# Patient Record
Sex: FEMALE | Race: WHITE | NOT HISPANIC OR LATINO | Employment: FULL TIME | URBAN - METROPOLITAN AREA
[De-identification: names, ages, dates, MRNs, and addresses within clinical notes are randomized per-mention and may not be internally consistent; named-entity substitution may affect disease eponyms.]

---

## 2023-03-01 PROBLEM — G47.00 INSOMNIA: Status: ACTIVE | Noted: 2023-03-01

## 2023-03-01 PROBLEM — M17.11 ARTHRITIS OF RIGHT KNEE: Status: ACTIVE | Noted: 2023-03-01

## 2023-03-01 PROBLEM — M23.203 DEGENERATIVE TEAR OF MEDIAL MENISCUS OF RIGHT KNEE: Status: ACTIVE | Noted: 2023-03-01

## 2023-03-01 PROBLEM — H52.13 MYOPIA OF BOTH EYES WITH REGULAR ASTIGMATISM: Status: ACTIVE | Noted: 2023-03-01

## 2023-03-01 PROBLEM — M72.2 PLANTAR FASCIITIS OF LEFT FOOT: Status: ACTIVE | Noted: 2023-03-01

## 2023-03-01 PROBLEM — N94.10 DYSPAREUNIA, FEMALE: Status: ACTIVE | Noted: 2023-03-01

## 2023-03-01 PROBLEM — R05.8 POST-VIRAL COUGH SYNDROME: Status: ACTIVE | Noted: 2023-03-01

## 2023-03-01 PROBLEM — M67.02 ACQUIRED SHORT ACHILLES TENDON OF LEFT LOWER EXTREMITY: Status: ACTIVE | Noted: 2023-03-01

## 2023-03-01 PROBLEM — F32.A ANXIETY AND DEPRESSION: Status: ACTIVE | Noted: 2023-03-01

## 2023-03-01 PROBLEM — H90.3 SENSORINEURAL HEARING LOSS, BILATERAL: Status: ACTIVE | Noted: 2023-03-01

## 2023-03-01 PROBLEM — E78.00 ELEVATED LDL CHOLESTEROL LEVEL: Status: ACTIVE | Noted: 2023-03-01

## 2023-03-01 PROBLEM — H52.223 MYOPIA OF BOTH EYES WITH REGULAR ASTIGMATISM: Status: ACTIVE | Noted: 2023-03-01

## 2023-03-01 PROBLEM — E66.812 CLASS 2 SEVERE OBESITY WITH SERIOUS COMORBIDITY AND BODY MASS INDEX (BMI) OF 37.0 TO 37.9 IN ADULT: Status: ACTIVE | Noted: 2023-03-01

## 2023-03-01 PROBLEM — Z86.19 HISTORY OF HERPES ZOSTER: Status: ACTIVE | Noted: 2023-03-01

## 2023-03-01 PROBLEM — M62.81 MUSCLE WEAKNESS: Status: ACTIVE | Noted: 2023-03-01

## 2023-03-01 PROBLEM — M76.32 ILIOTIBIAL BAND SYNDROME OF LEFT SIDE: Status: ACTIVE | Noted: 2023-03-01

## 2023-03-01 PROBLEM — M25.561 RIGHT KNEE PAIN: Status: ACTIVE | Noted: 2023-03-01

## 2023-03-01 PROBLEM — R39.9 UTI SYMPTOMS: Status: ACTIVE | Noted: 2023-03-01

## 2023-03-01 PROBLEM — E66.01 CLASS 2 SEVERE OBESITY WITH SERIOUS COMORBIDITY AND BODY MASS INDEX (BMI) OF 37.0 TO 37.9 IN ADULT (MULTI): Status: ACTIVE | Noted: 2023-03-01

## 2023-03-01 PROBLEM — F41.9 ANXIETY AND DEPRESSION: Status: ACTIVE | Noted: 2023-03-01

## 2023-03-01 PROBLEM — R35.0 URINARY FREQUENCY: Status: ACTIVE | Noted: 2023-03-01

## 2023-03-01 PROBLEM — R42 VERTIGO: Status: ACTIVE | Noted: 2023-03-01

## 2023-03-01 PROBLEM — R51.9 HEADACHE: Status: ACTIVE | Noted: 2023-03-01

## 2023-03-01 PROBLEM — M17.12 PRIMARY OSTEOARTHRITIS OF LEFT KNEE: Status: ACTIVE | Noted: 2023-03-01

## 2023-03-01 PROBLEM — G43.109 OPHTHALMIC MIGRAINE: Status: ACTIVE | Noted: 2023-03-01

## 2023-03-01 PROBLEM — Q66.70 CAVUS DEFORMITY OF FOOT: Status: ACTIVE | Noted: 2023-03-01

## 2023-03-01 PROBLEM — M17.11 OSTEOARTHRITIS OF RIGHT PATELLOFEMORAL JOINT: Status: ACTIVE | Noted: 2023-03-01

## 2023-03-01 PROBLEM — H52.03 HYPERMETROPIA OF BOTH EYES: Status: ACTIVE | Noted: 2023-03-01

## 2023-03-01 PROBLEM — B37.31 YEAST VAGINITIS: Status: ACTIVE | Noted: 2023-03-01

## 2023-03-01 PROBLEM — R05.8 POST-VIRAL COUGH SYNDROME: Status: RESOLVED | Noted: 2023-03-01 | Resolved: 2023-03-01

## 2023-03-01 PROBLEM — R73.01 ELEVATED FASTING GLUCOSE: Status: ACTIVE | Noted: 2023-03-01

## 2023-03-01 PROBLEM — H81.10 BENIGN PAROXYSMAL POSITIONAL VERTIGO: Status: ACTIVE | Noted: 2023-03-01

## 2023-03-01 PROBLEM — M79.672 PAIN OF LEFT HEEL: Status: ACTIVE | Noted: 2023-03-01

## 2023-03-01 PROBLEM — J40 BRONCHITIS: Status: ACTIVE | Noted: 2023-03-01

## 2023-03-01 PROBLEM — H66.90 EAR INFECTION: Status: ACTIVE | Noted: 2023-03-01

## 2023-03-01 RX ORDER — CALCIUM CARBONATE/VITAMIN D3 600MG-5MCG
TABLET ORAL
COMMUNITY

## 2023-03-01 RX ORDER — ALBUTEROL SULFATE 90 UG/1
1-2 AEROSOL, METERED RESPIRATORY (INHALATION) EVERY 4 HOURS PRN
COMMUNITY
Start: 2019-11-06

## 2023-03-09 ENCOUNTER — OFFICE VISIT (OUTPATIENT)
Dept: PRIMARY CARE | Facility: CLINIC | Age: 58
End: 2023-03-09
Payer: COMMERCIAL

## 2023-03-09 VITALS
WEIGHT: 205 LBS | DIASTOLIC BLOOD PRESSURE: 71 MMHG | BODY MASS INDEX: 35.74 KG/M2 | SYSTOLIC BLOOD PRESSURE: 128 MMHG | HEART RATE: 68 BPM

## 2023-03-09 DIAGNOSIS — F41.9 ANXIETY AND DEPRESSION: ICD-10-CM

## 2023-03-09 DIAGNOSIS — F41.9 ANXIETY: Primary | ICD-10-CM

## 2023-03-09 DIAGNOSIS — F32.A ANXIETY AND DEPRESSION: ICD-10-CM

## 2023-03-09 DIAGNOSIS — E78.00 ELEVATED LDL CHOLESTEROL LEVEL: ICD-10-CM

## 2023-03-09 DIAGNOSIS — E66.01 CLASS 2 SEVERE OBESITY DUE TO EXCESS CALORIES WITH SERIOUS COMORBIDITY AND BODY MASS INDEX (BMI) OF 37.0 TO 37.9 IN ADULT (MULTI): ICD-10-CM

## 2023-03-09 PROBLEM — B37.31 YEAST VAGINITIS: Status: RESOLVED | Noted: 2023-03-01 | Resolved: 2023-03-09

## 2023-03-09 PROBLEM — M25.561 RIGHT KNEE PAIN: Status: RESOLVED | Noted: 2023-03-01 | Resolved: 2023-03-09

## 2023-03-09 PROBLEM — J40 BRONCHITIS: Status: RESOLVED | Noted: 2023-03-01 | Resolved: 2023-03-09

## 2023-03-09 PROBLEM — R39.9 UTI SYMPTOMS: Status: RESOLVED | Noted: 2023-03-01 | Resolved: 2023-03-09

## 2023-03-09 PROBLEM — M23.203 DEGENERATIVE TEAR OF MEDIAL MENISCUS OF RIGHT KNEE: Status: RESOLVED | Noted: 2023-03-01 | Resolved: 2023-03-09

## 2023-03-09 PROBLEM — M79.672 PAIN OF LEFT HEEL: Status: RESOLVED | Noted: 2023-03-01 | Resolved: 2023-03-09

## 2023-03-09 PROBLEM — R35.0 URINARY FREQUENCY: Status: RESOLVED | Noted: 2023-03-01 | Resolved: 2023-03-09

## 2023-03-09 PROBLEM — R42 VERTIGO: Status: RESOLVED | Noted: 2023-03-01 | Resolved: 2023-03-09

## 2023-03-09 PROBLEM — R73.01 ELEVATED FASTING GLUCOSE: Status: RESOLVED | Noted: 2023-03-01 | Resolved: 2023-03-09

## 2023-03-09 PROBLEM — M72.2 PLANTAR FASCIITIS OF LEFT FOOT: Status: RESOLVED | Noted: 2023-03-01 | Resolved: 2023-03-09

## 2023-03-09 PROBLEM — H81.10 BENIGN PAROXYSMAL POSITIONAL VERTIGO: Status: RESOLVED | Noted: 2023-03-01 | Resolved: 2023-03-09

## 2023-03-09 PROBLEM — M17.11 ARTHRITIS OF RIGHT KNEE: Status: RESOLVED | Noted: 2023-03-01 | Resolved: 2023-03-09

## 2023-03-09 PROBLEM — M62.81 MUSCLE WEAKNESS: Status: RESOLVED | Noted: 2023-03-01 | Resolved: 2023-03-09

## 2023-03-09 PROBLEM — M76.32 ILIOTIBIAL BAND SYNDROME OF LEFT SIDE: Status: RESOLVED | Noted: 2023-03-01 | Resolved: 2023-03-09

## 2023-03-09 PROBLEM — M67.02 ACQUIRED SHORT ACHILLES TENDON OF LEFT LOWER EXTREMITY: Status: RESOLVED | Noted: 2023-03-01 | Resolved: 2023-03-09

## 2023-03-09 PROBLEM — Z86.19 HISTORY OF HERPES ZOSTER: Status: RESOLVED | Noted: 2023-03-01 | Resolved: 2023-03-09

## 2023-03-09 PROBLEM — H90.3 SENSORINEURAL HEARING LOSS, BILATERAL: Status: RESOLVED | Noted: 2023-03-01 | Resolved: 2023-03-09

## 2023-03-09 PROBLEM — M17.11 OSTEOARTHRITIS OF RIGHT PATELLOFEMORAL JOINT: Status: RESOLVED | Noted: 2023-03-01 | Resolved: 2023-03-09

## 2023-03-09 PROBLEM — H66.90 EAR INFECTION: Status: RESOLVED | Noted: 2023-03-01 | Resolved: 2023-03-09

## 2023-03-09 PROBLEM — Q66.70 CAVUS DEFORMITY OF FOOT: Status: RESOLVED | Noted: 2023-03-01 | Resolved: 2023-03-09

## 2023-03-09 PROCEDURE — 3008F BODY MASS INDEX DOCD: CPT | Performed by: INTERNAL MEDICINE

## 2023-03-09 PROCEDURE — 1036F TOBACCO NON-USER: CPT | Performed by: INTERNAL MEDICINE

## 2023-03-09 PROCEDURE — 99213 OFFICE O/P EST LOW 20 MIN: CPT | Performed by: INTERNAL MEDICINE

## 2023-03-09 RX ORDER — BUSPIRONE HYDROCHLORIDE 5 MG/1
5 TABLET ORAL 2 TIMES DAILY
COMMUNITY
End: 2023-03-09 | Stop reason: SDUPTHER

## 2023-03-09 RX ORDER — BUSPIRONE HYDROCHLORIDE 5 MG/1
5 TABLET ORAL 2 TIMES DAILY
Qty: 180 TABLET | Refills: 1 | Status: SHIPPED | OUTPATIENT
Start: 2023-03-09 | End: 2023-08-15 | Stop reason: ALTCHOICE

## 2023-03-09 ASSESSMENT — PATIENT HEALTH QUESTIONNAIRE - PHQ9
2. FEELING DOWN, DEPRESSED OR HOPELESS: MORE THAN HALF THE DAYS
6. FEELING BAD ABOUT YOURSELF - OR THAT YOU ARE A FAILURE OR HAVE LET YOURSELF OR YOUR FAMILY DOWN: NOT AT ALL
7. TROUBLE CONCENTRATING ON THINGS, SUCH AS READING THE NEWSPAPER OR WATCHING TELEVISION: NOT AT ALL
3. TROUBLE FALLING OR STAYING ASLEEP OR SLEEPING TOO MUCH: NOT AT ALL
1. LITTLE INTEREST OR PLEASURE IN DOING THINGS: MORE THAN HALF THE DAYS
5. POOR APPETITE OR OVEREATING: NOT AT ALL
4. FEELING TIRED OR HAVING LITTLE ENERGY: MORE THAN HALF THE DAYS
SUM OF ALL RESPONSES TO PHQ9 QUESTIONS 1 AND 2: 4
10. IF YOU CHECKED OFF ANY PROBLEMS, HOW DIFFICULT HAVE THESE PROBLEMS MADE IT FOR YOU TO DO YOUR WORK, TAKE CARE OF THINGS AT HOME, OR GET ALONG WITH OTHER PEOPLE: NOT DIFFICULT AT ALL
SUM OF ALL RESPONSES TO PHQ QUESTIONS 1-9: 7
9. THOUGHTS THAT YOU WOULD BE BETTER OFF DEAD, OR OF HURTING YOURSELF: NOT AT ALL
8. MOVING OR SPEAKING SO SLOWLY THAT OTHER PEOPLE COULD HAVE NOTICED. OR THE OPPOSITE, BEING SO FIGETY OR RESTLESS THAT YOU HAVE BEEN MOVING AROUND A LOT MORE THAN USUAL: SEVERAL DAYS

## 2023-03-09 NOTE — ASSESSMENT & PLAN NOTE
Considering patient did not tolerate the Wellbutrin we will change for BuSpar twice daily.  Return to clinic in 30 days and if no significant improvement will consider SSRI

## 2023-03-09 NOTE — PROGRESS NOTES
Subjective   Patient ID: Brandy Lopez is a 57 y.o. female who presents for Depression.    HPI     Patient is a 57-year-old female who presents with chief complaint of ongoing symptoms of depression.  She was on Wellbutrin but she states that its not effective and she is concerned about the weight gain on the medication.  She is has gained 2 pound since last visit.  She wishes to stop the medication and start an alternate medication.  She is concerned about the SSRIs with weight gain as well.  She states that she can barely get through the day and finds it a struggle to get tasks completed at the end of the day when she returns from home.    Review of Systems  Constitutional: No fever or chills  Cardiovascular: no chest pain, no palpitations and no syncope.   Respiratory: no cough, no shortness of breath during exertion and no shortness of breath at rest.   Gastrointestinal: no abdominal pain, no nausea and no vomiting.  Neuro: No Headache, no dizziness    Objective   /71   Pulse 68   Wt 93 kg (205 lb)   BMI 35.74 kg/m²     Physical Exam  Constitutional: Alert and in no acute distress. Well developed, well nourished  Head and Face: Head and face: Normal.    Cardiovascular: Heart rate and rhythm were normal, normal S1 and S2. No peripheral edema.   Pulmonary: No respiratory distress. Clear bilateral breath sounds.  Musculoskeletal: Gait and station: Normal. Muscle strength/tone: Normal.   Skin: Normal skin color and pigmentation, normal skin turgor, and no rash.    Psychiatric: Judgment and insight: Intact. Mood and affect: Normal.     [unfilled]      Assessment/Plan   Problem List Items Addressed This Visit          Endocrine/Metabolic    Class 2 severe obesity with serious comorbidity and body mass index (BMI) of 37.0 to 37.9 in adult (CMS/HCC)       Other    Anxiety and depression     Considering patient did not tolerate the Wellbutrin we will change for BuSpar twice daily.  Return to clinic in 30  days and if no significant improvement will consider SSRI         Elevated LDL cholesterol level     Other Visit Diagnoses       Anxiety    -  Primary    Relevant Medications    busPIRone (Buspar) 5 mg tablet        Considering no improvement on the Wellbutrin as well as slight weight gain we will change medications to BuSpar twice daily.  If no significant improvement will need follow-up in 30 days for consideration of SSRI        Follow up in 30 days    Your yearly Physical is due in:   When you call the office for your yearly Physical, please ask them to inform me to order your blood work, so that you can get the fasting blood work before your appointment and we can discuss the results at your physical.      Please call me if any questions arise from now until your next visit. I will call you after I am done seeing patients. A Doctor is always available by phone when the office is closed. Please feel free to call for help with any problem that you feel shouldn't wait until the office re-opens.     Pietro Sosa, DO

## 2023-04-10 ENCOUNTER — OFFICE VISIT (OUTPATIENT)
Dept: PRIMARY CARE | Facility: CLINIC | Age: 58
End: 2023-04-10
Payer: COMMERCIAL

## 2023-04-10 VITALS
HEART RATE: 81 BPM | BODY MASS INDEX: 36.62 KG/M2 | DIASTOLIC BLOOD PRESSURE: 80 MMHG | SYSTOLIC BLOOD PRESSURE: 131 MMHG | WEIGHT: 210 LBS

## 2023-04-10 DIAGNOSIS — F41.9 ANXIETY AND DEPRESSION: ICD-10-CM

## 2023-04-10 DIAGNOSIS — F32.A ANXIETY AND DEPRESSION: ICD-10-CM

## 2023-04-10 DIAGNOSIS — F51.01 PRIMARY INSOMNIA: Primary | ICD-10-CM

## 2023-04-10 PROCEDURE — 99213 OFFICE O/P EST LOW 20 MIN: CPT | Performed by: INTERNAL MEDICINE

## 2023-04-10 PROCEDURE — 1036F TOBACCO NON-USER: CPT | Performed by: INTERNAL MEDICINE

## 2023-04-10 PROCEDURE — 3008F BODY MASS INDEX DOCD: CPT | Performed by: INTERNAL MEDICINE

## 2023-04-10 RX ORDER — SERTRALINE HYDROCHLORIDE 25 MG/1
25 TABLET, FILM COATED ORAL DAILY
Qty: 30 TABLET | Refills: 1 | Status: SHIPPED | OUTPATIENT
Start: 2023-04-10 | End: 2023-05-15 | Stop reason: SDUPTHER

## 2023-04-10 ASSESSMENT — PATIENT HEALTH QUESTIONNAIRE - PHQ9
7. TROUBLE CONCENTRATING ON THINGS, SUCH AS READING THE NEWSPAPER OR WATCHING TELEVISION: NOT AT ALL
SUM OF ALL RESPONSES TO PHQ QUESTIONS 1-9: 5
1. LITTLE INTEREST OR PLEASURE IN DOING THINGS: MORE THAN HALF THE DAYS
5. POOR APPETITE OR OVEREATING: NOT AT ALL
6. FEELING BAD ABOUT YOURSELF - OR THAT YOU ARE A FAILURE OR HAVE LET YOURSELF OR YOUR FAMILY DOWN: NOT AT ALL
3. TROUBLE FALLING OR STAYING ASLEEP OR SLEEPING TOO MUCH: NOT AT ALL
8. MOVING OR SPEAKING SO SLOWLY THAT OTHER PEOPLE COULD HAVE NOTICED. OR THE OPPOSITE, BEING SO FIGETY OR RESTLESS THAT YOU HAVE BEEN MOVING AROUND A LOT MORE THAN USUAL: NOT AT ALL
SUM OF ALL RESPONSES TO PHQ9 QUESTIONS 1 AND 2: 4
10. IF YOU CHECKED OFF ANY PROBLEMS, HOW DIFFICULT HAVE THESE PROBLEMS MADE IT FOR YOU TO DO YOUR WORK, TAKE CARE OF THINGS AT HOME, OR GET ALONG WITH OTHER PEOPLE: NOT DIFFICULT AT ALL
4. FEELING TIRED OR HAVING LITTLE ENERGY: SEVERAL DAYS
2. FEELING DOWN, DEPRESSED OR HOPELESS: MORE THAN HALF THE DAYS
9. THOUGHTS THAT YOU WOULD BE BETTER OFF DEAD, OR OF HURTING YOURSELF: NOT AT ALL

## 2023-04-10 NOTE — ASSESSMENT & PLAN NOTE
Patient did not tolerate Wellbutrin or BuSpar.  At this point patient is agreeable to start low-dose SSRI.  May add BuSpar on an as-needed basis.  Will refer to psychiatrist at this time.  Please follow-up in 30 days and if you feel like you need to follow-up earlier please consider calling her making an earlier appointment

## 2023-04-10 NOTE — PROGRESS NOTES
Subjective   Patient ID: Brandy Lopez is a 57 y.o. female who presents for Follow-up.    HPI     Patient is a 57-year-old female who presents with chief complaint of ongoing symptoms of depression.  She was on Wellbutrin but she states that its not effective and she is concerned about the weight gain on the medication.  She is concerned about the SSRIs with weight gain as well.  She states that she can barely get through the day and finds it a struggle to get tasks completed at the end of the day when she returns from home.  She was started on BuSpar last visit but did not see any relief.  She is able to get through the day but finds himself teary and sad on the way home.  She lost her  as well as a recent family member as well.  She has a therapist but does not have a psychiatrist    Review of Systems  Constitutional: No fever or chills  Cardiovascular: no chest pain, no palpitations and no syncope.   Respiratory: no cough, no shortness of breath during exertion and no shortness of breath at rest.   Gastrointestinal: no abdominal pain, no nausea and no vomiting.  Neuro: No Headache, no dizziness    Objective   /80   Pulse 81   Wt 95.3 kg (210 lb)   BMI 36.62 kg/m²     Physical Exam  Constitutional: Alert and in no acute distress. Well developed, well nourished  Head and Face: Head and face: Normal.    Cardiovascular: Heart rate and rhythm were normal, normal S1 and S2. No peripheral edema.   Pulmonary: No respiratory distress. Clear bilateral breath sounds.  Musculoskeletal: Gait and station: Normal. Muscle strength/tone: Normal.   Skin: Normal skin color and pigmentation, normal skin turgor, and no rash.    Psychiatric: Judgment and insight: Intact. Mood and affect: Normal.     [unfilled]      Assessment/Plan   Problem List Items Addressed This Visit          Nervous    Insomnia - Primary     Start low-dose SSRI and referral to psychiatry         Relevant Medications    sertraline (Zoloft) 25  mg tablet    Other Relevant Orders    Referral to Psychiatry       Other    Anxiety and depression     Patient did not tolerate Wellbutrin or BuSpar.  At this point patient is agreeable to start low-dose SSRI.  May add BuSpar on an as-needed basis.  Will refer to psychiatrist at this time.  Please follow-up in 30 days and if you feel like you need to follow-up earlier please consider calling her making an earlier appointment         Relevant Medications    sertraline (Zoloft) 25 mg tablet    Other Relevant Orders    Referral to Psychiatry

## 2023-05-04 DIAGNOSIS — F51.01 PRIMARY INSOMNIA: ICD-10-CM

## 2023-05-04 DIAGNOSIS — F32.A ANXIETY AND DEPRESSION: ICD-10-CM

## 2023-05-04 DIAGNOSIS — F41.9 ANXIETY AND DEPRESSION: ICD-10-CM

## 2023-05-04 RX ORDER — SERTRALINE HYDROCHLORIDE 25 MG/1
TABLET, FILM COATED ORAL
Qty: 30 TABLET | Refills: 1 | OUTPATIENT
Start: 2023-05-04

## 2023-05-15 DIAGNOSIS — F41.9 ANXIETY AND DEPRESSION: ICD-10-CM

## 2023-05-15 DIAGNOSIS — F32.A ANXIETY AND DEPRESSION: ICD-10-CM

## 2023-05-15 DIAGNOSIS — F51.01 PRIMARY INSOMNIA: ICD-10-CM

## 2023-05-15 RX ORDER — SERTRALINE HYDROCHLORIDE 25 MG/1
25 TABLET, FILM COATED ORAL DAILY
Qty: 30 TABLET | Refills: 1 | Status: SHIPPED | OUTPATIENT
Start: 2023-05-15 | End: 2023-05-16 | Stop reason: SDUPTHER

## 2023-05-16 DIAGNOSIS — F41.9 ANXIETY AND DEPRESSION: ICD-10-CM

## 2023-05-16 DIAGNOSIS — F32.A ANXIETY AND DEPRESSION: ICD-10-CM

## 2023-05-16 DIAGNOSIS — F51.01 PRIMARY INSOMNIA: ICD-10-CM

## 2023-05-16 RX ORDER — SERTRALINE HYDROCHLORIDE 25 MG/1
25 TABLET, FILM COATED ORAL DAILY
Qty: 180 TABLET | Refills: 1 | Status: SHIPPED | OUTPATIENT
Start: 2023-05-16 | End: 2023-08-15 | Stop reason: SDUPTHER

## 2023-06-06 DIAGNOSIS — F51.01 PRIMARY INSOMNIA: ICD-10-CM

## 2023-06-06 DIAGNOSIS — F32.A ANXIETY AND DEPRESSION: ICD-10-CM

## 2023-06-06 DIAGNOSIS — F41.9 ANXIETY AND DEPRESSION: ICD-10-CM

## 2023-06-06 RX ORDER — SERTRALINE HYDROCHLORIDE 25 MG/1
TABLET, FILM COATED ORAL
Qty: 30 TABLET | Refills: 1 | OUTPATIENT
Start: 2023-06-06

## 2023-08-01 ENCOUNTER — APPOINTMENT (OUTPATIENT)
Dept: PRIMARY CARE | Facility: CLINIC | Age: 58
End: 2023-08-01
Payer: COMMERCIAL

## 2023-08-15 ENCOUNTER — OFFICE VISIT (OUTPATIENT)
Dept: PRIMARY CARE | Facility: CLINIC | Age: 58
End: 2023-08-15
Payer: COMMERCIAL

## 2023-08-15 VITALS
SYSTOLIC BLOOD PRESSURE: 119 MMHG | HEART RATE: 67 BPM | BODY MASS INDEX: 38.36 KG/M2 | DIASTOLIC BLOOD PRESSURE: 71 MMHG | WEIGHT: 220 LBS

## 2023-08-15 DIAGNOSIS — F41.9 ANXIETY AND DEPRESSION: ICD-10-CM

## 2023-08-15 DIAGNOSIS — F51.01 PRIMARY INSOMNIA: ICD-10-CM

## 2023-08-15 DIAGNOSIS — Z00.00 HEALTH CARE MAINTENANCE: ICD-10-CM

## 2023-08-15 DIAGNOSIS — F32.A ANXIETY AND DEPRESSION: ICD-10-CM

## 2023-08-15 DIAGNOSIS — Z23 NEED FOR VACCINE FOR DT (DIPHTHERIA-TETANUS): Primary | ICD-10-CM

## 2023-08-15 DIAGNOSIS — Z12.31 SCREENING MAMMOGRAM FOR BREAST CANCER: ICD-10-CM

## 2023-08-15 PROCEDURE — 90471 IMMUNIZATION ADMIN: CPT | Performed by: INTERNAL MEDICINE

## 2023-08-15 PROCEDURE — 99213 OFFICE O/P EST LOW 20 MIN: CPT | Performed by: INTERNAL MEDICINE

## 2023-08-15 PROCEDURE — 90715 TDAP VACCINE 7 YRS/> IM: CPT | Performed by: INTERNAL MEDICINE

## 2023-08-15 PROCEDURE — 3008F BODY MASS INDEX DOCD: CPT | Performed by: INTERNAL MEDICINE

## 2023-08-15 PROCEDURE — 1036F TOBACCO NON-USER: CPT | Performed by: INTERNAL MEDICINE

## 2023-08-15 RX ORDER — SERTRALINE HYDROCHLORIDE 25 MG/1
25 TABLET, FILM COATED ORAL DAILY
Qty: 90 TABLET | Refills: 1 | Status: SHIPPED | OUTPATIENT
Start: 2023-08-15 | End: 2024-05-06

## 2023-08-15 NOTE — ASSESSMENT & PLAN NOTE
Given improvement on the low-dose sertraline we will continue at this time.  We will need to monitor weight.  If she continues to gain weight we may need to stop the medication and try an alternate medication.  Follow-up 3 months for reevaluation

## 2023-08-15 NOTE — PROGRESS NOTES
Subjective   Patient ID: Brandy Lopez is a 58 y.o. female who presents for Follow-up.    HPI     Patient is a 58-year-old female who presents with chief complaint of ongoing symptoms of depression.  She currently does not have a psychiatrist.  She was previously on the Wellbutrin but did not tolerate it very well.  Last visit she was started on low-dose sertraline.  She is on 25 mg p.o. daily.  She states that her mood is improved.  She is enjoying life better.  She is sleeping better.  She does not feel down as much.  She wishes to continue the medication.  Of note she has gained 10 pounds since last visit.  Is unclear whether or not this may be related to the medication or eating habits or combination of the 2.    Review of Systems  Constitutional: No fever or chills  Cardiovascular: no chest pain, no palpitations and no syncope.   Respiratory: no cough, no shortness of breath during exertion and no shortness of breath at rest.   Gastrointestinal: no abdominal pain, no nausea and no vomiting.  Neuro: No Headache, no dizziness    Objective   /71   Pulse 67   Wt 99.8 kg (220 lb)   BMI 38.36 kg/m²     Physical Exam  Constitutional: Alert and in no acute distress. Well developed, well nourished  Head and Face: Head and face: Normal.    Cardiovascular: Heart rate and rhythm were normal, normal S1 and S2. No peripheral edema.   Pulmonary: No respiratory distress. Clear bilateral breath sounds.  Musculoskeletal: Gait and station: Normal. Muscle strength/tone: Normal.   Skin: Normal skin color and pigmentation, normal skin turgor, and no rash.    Psychiatric: Judgment and insight: Intact. Mood and affect: Normal.     [unfilled]      Assessment/Plan   Problem List Items Addressed This Visit          Mental Health    Anxiety and depression     Given improvement on the low-dose sertraline we will continue at this time.  We will need to monitor weight.  If she continues to gain weight we may need to stop the  medication and try an alternate medication.  Follow-up 3 months for reevaluation         Relevant Medications    sertraline (Zoloft) 25 mg tablet    Other Relevant Orders    CBC    Comprehensive metabolic panel    Lipid Panel    TSH with reflex to Free T4 if abnormal       Sleep    Insomnia    Relevant Medications    sertraline (Zoloft) 25 mg tablet    Other Relevant Orders    CBC    Comprehensive metabolic panel    Lipid Panel    TSH with reflex to Free T4 if abnormal     Other Visit Diagnoses       Need for vaccine for DT (diphtheria-tetanus)    -  Primary    Relevant Orders    Tdap vaccine, age 10 years and older (BOOSTRIX) (Completed)    CBC    Comprehensive metabolic panel    Lipid Panel    TSH with reflex to Free T4 if abnormal    Health care maintenance        Relevant Orders    CBC    Comprehensive metabolic panel    Lipid Panel    TSH with reflex to Free T4 if abnormal    Screening mammogram for breast cancer        Relevant Orders    BI mammo bilateral screening tomosynthesis

## 2023-09-13 ENCOUNTER — OFFICE VISIT (OUTPATIENT)
Dept: PRIMARY CARE | Facility: CLINIC | Age: 58
End: 2023-09-13
Payer: COMMERCIAL

## 2023-09-13 VITALS
SYSTOLIC BLOOD PRESSURE: 131 MMHG | BODY MASS INDEX: 38.36 KG/M2 | WEIGHT: 220 LBS | DIASTOLIC BLOOD PRESSURE: 83 MMHG | TEMPERATURE: 98.2 F | OXYGEN SATURATION: 97 %

## 2023-09-13 DIAGNOSIS — J21.9 ACUTE BRONCHIOLITIS DUE TO UNSPECIFIED ORGANISM: Primary | ICD-10-CM

## 2023-09-13 PROCEDURE — 3008F BODY MASS INDEX DOCD: CPT | Performed by: INTERNAL MEDICINE

## 2023-09-13 PROCEDURE — 1036F TOBACCO NON-USER: CPT | Performed by: INTERNAL MEDICINE

## 2023-09-13 PROCEDURE — 99213 OFFICE O/P EST LOW 20 MIN: CPT | Performed by: INTERNAL MEDICINE

## 2023-09-13 RX ORDER — ALBUTEROL SULFATE 90 UG/1
2 AEROSOL, METERED RESPIRATORY (INHALATION) EVERY 4 HOURS PRN
Qty: 8.5 G | Refills: 0 | Status: SHIPPED | OUTPATIENT
Start: 2023-09-13 | End: 2024-09-12

## 2023-09-13 RX ORDER — BENZONATATE 100 MG/1
100 CAPSULE ORAL 3 TIMES DAILY PRN
Qty: 42 CAPSULE | Refills: 0 | Status: SHIPPED | OUTPATIENT
Start: 2023-09-13 | End: 2023-10-13

## 2023-09-13 RX ORDER — METHYLPREDNISOLONE 4 MG/1
TABLET ORAL
Qty: 21 TABLET | Refills: 0 | Status: SHIPPED | OUTPATIENT
Start: 2023-09-13 | End: 2023-09-20

## 2023-09-13 ASSESSMENT — ENCOUNTER SYMPTOMS: COUGH: 1

## 2023-09-13 NOTE — PROGRESS NOTES
Subjective   Patient ID: Brandy Lopez is a 58 y.o. female who presents for Cough.    Cough       Patient is a 58-year-old female who presents with chief complaint of ongoing symptoms of cough and sore throat ongoing x1 week.  No nasal congestion no fevers or chills.  She reports slight productive sputum.  No shortness of breath on exertion but she does hear herself wheezing at times.  She states there have been 2 people at work who are diagnosed with acute bronchitis recently.    Review of Systems  Constitutional: No fever or chills  Cardiovascular: no chest pain, no palpitations and no syncope.   Respiratory: Cough with active wheezing.  No shortness of breath  Gastrointestinal: no abdominal pain, no nausea and no vomiting.  Neuro: No Headache, no dizziness    Objective   /83   Temp 36.8 °C (98.2 °F)   Wt 99.8 kg (220 lb)   SpO2 97%   BMI 38.36 kg/m²     Physical Exam  Constitutional: Alert and in no acute distress. Well developed, well nourished  Head and Face: Head and face: Normal.    Cardiovascular: Heart rate and rhythm were normal, normal S1 and S2. No peripheral edema.   Pulmonary:   Musculoskeletal: Gait and station: Normal. Muscle strength/tone: Normal.   Skin: Normal skin color and pigmentation, normal skin turgor, and no rash.    Psychiatric: Judgment and insight: Intact. Mood and affect: Normal.     [unfilled]      Assessment/Plan   Problem List Items Addressed This Visit       Acute bronchiolitis - Primary    Relevant Medications    methylPREDNISolone (Medrol Dospak) 4 mg tablets    albuterol (ProAir HFA) 90 mcg/actuation inhaler    benzonatate (Tessalon) 100 mg capsule     Likely acute bronchitis.  Explained that these are usually self-limiting and can take up to 3 weeks to go away on its own.  We will provide Medrol for symptomatic relief as well as Tessalon Perles for cough.  We will provide ProAir as needed for shortness of breath.  Follow-up if no improvement in 1 to 2 weeks for  consideration of chest x-ray or if symptoms are worsening

## 2023-09-26 ENCOUNTER — TELEPHONE (OUTPATIENT)
Dept: PRIMARY CARE | Facility: CLINIC | Age: 58
End: 2023-09-26
Payer: COMMERCIAL

## 2023-10-12 ENCOUNTER — TELEPHONE (OUTPATIENT)
Dept: PRIMARY CARE | Facility: CLINIC | Age: 58
End: 2023-10-12
Payer: COMMERCIAL

## 2023-11-20 ENCOUNTER — LAB (OUTPATIENT)
Dept: LAB | Facility: LAB | Age: 58
End: 2023-11-20
Payer: COMMERCIAL

## 2023-11-20 DIAGNOSIS — Z23 NEED FOR VACCINE FOR DT (DIPHTHERIA-TETANUS): ICD-10-CM

## 2023-11-20 DIAGNOSIS — F41.9 ANXIETY AND DEPRESSION: ICD-10-CM

## 2023-11-20 DIAGNOSIS — F51.01 PRIMARY INSOMNIA: ICD-10-CM

## 2023-11-20 DIAGNOSIS — F32.A ANXIETY AND DEPRESSION: ICD-10-CM

## 2023-11-20 DIAGNOSIS — Z00.00 HEALTH CARE MAINTENANCE: ICD-10-CM

## 2023-11-20 LAB
ALBUMIN SERPL BCP-MCNC: 4.2 G/DL (ref 3.4–5)
ALP SERPL-CCNC: 63 U/L (ref 33–110)
ALT SERPL W P-5'-P-CCNC: 15 U/L (ref 7–45)
ANION GAP SERPL CALC-SCNC: 12 MMOL/L (ref 10–20)
AST SERPL W P-5'-P-CCNC: 11 U/L (ref 9–39)
BILIRUB SERPL-MCNC: 0.4 MG/DL (ref 0–1.2)
BUN SERPL-MCNC: 14 MG/DL (ref 6–23)
CALCIUM SERPL-MCNC: 9 MG/DL (ref 8.6–10.6)
CHLORIDE SERPL-SCNC: 106 MMOL/L (ref 98–107)
CHOLEST SERPL-MCNC: 204 MG/DL (ref 0–199)
CHOLESTEROL/HDL RATIO: 3.9
CO2 SERPL-SCNC: 28 MMOL/L (ref 21–32)
CREAT SERPL-MCNC: 0.68 MG/DL (ref 0.5–1.05)
ERYTHROCYTE [DISTWIDTH] IN BLOOD BY AUTOMATED COUNT: 13.4 % (ref 11.5–14.5)
GFR SERPL CREATININE-BSD FRML MDRD: >90 ML/MIN/1.73M*2
GLUCOSE SERPL-MCNC: 99 MG/DL (ref 74–99)
HCT VFR BLD AUTO: 43.1 % (ref 36–46)
HDLC SERPL-MCNC: 52.1 MG/DL
HGB BLD-MCNC: 13.6 G/DL (ref 12–16)
LDLC SERPL CALC-MCNC: 126 MG/DL
MCH RBC QN AUTO: 29.2 PG (ref 26–34)
MCHC RBC AUTO-ENTMCNC: 31.6 G/DL (ref 32–36)
MCV RBC AUTO: 93 FL (ref 80–100)
NON HDL CHOLESTEROL: 152 MG/DL (ref 0–149)
NRBC BLD-RTO: 0 /100 WBCS (ref 0–0)
PLATELET # BLD AUTO: 277 X10*3/UL (ref 150–450)
POTASSIUM SERPL-SCNC: 4.4 MMOL/L (ref 3.5–5.3)
PROT SERPL-MCNC: 6.7 G/DL (ref 6.4–8.2)
RBC # BLD AUTO: 4.66 X10*6/UL (ref 4–5.2)
SODIUM SERPL-SCNC: 142 MMOL/L (ref 136–145)
TRIGL SERPL-MCNC: 129 MG/DL (ref 0–149)
TSH SERPL-ACNC: 2.14 MIU/L (ref 0.44–3.98)
VLDL: 26 MG/DL (ref 0–40)
WBC # BLD AUTO: 5 X10*3/UL (ref 4.4–11.3)

## 2023-11-20 PROCEDURE — 80053 COMPREHEN METABOLIC PANEL: CPT

## 2023-11-20 PROCEDURE — 80061 LIPID PANEL: CPT

## 2023-11-20 PROCEDURE — 36415 COLL VENOUS BLD VENIPUNCTURE: CPT

## 2023-11-20 PROCEDURE — 85027 COMPLETE CBC AUTOMATED: CPT

## 2023-11-20 PROCEDURE — 84443 ASSAY THYROID STIM HORMONE: CPT

## 2023-11-21 ENCOUNTER — OFFICE VISIT (OUTPATIENT)
Dept: PRIMARY CARE | Facility: CLINIC | Age: 58
End: 2023-11-21
Payer: COMMERCIAL

## 2023-11-21 VITALS
DIASTOLIC BLOOD PRESSURE: 76 MMHG | SYSTOLIC BLOOD PRESSURE: 144 MMHG | WEIGHT: 223 LBS | BODY MASS INDEX: 38.88 KG/M2 | HEART RATE: 76 BPM

## 2023-11-21 DIAGNOSIS — E88.810 METABOLIC SYNDROME X: ICD-10-CM

## 2023-11-21 PROBLEM — I10 PRIMARY HYPERTENSION: Status: ACTIVE | Noted: 2023-11-21

## 2023-11-21 PROCEDURE — 1036F TOBACCO NON-USER: CPT | Performed by: INTERNAL MEDICINE

## 2023-11-21 PROCEDURE — 3077F SYST BP >= 140 MM HG: CPT | Performed by: INTERNAL MEDICINE

## 2023-11-21 PROCEDURE — 3078F DIAST BP <80 MM HG: CPT | Performed by: INTERNAL MEDICINE

## 2023-11-21 PROCEDURE — 3008F BODY MASS INDEX DOCD: CPT | Performed by: INTERNAL MEDICINE

## 2023-11-21 PROCEDURE — 99213 OFFICE O/P EST LOW 20 MIN: CPT | Performed by: INTERNAL MEDICINE

## 2023-11-21 RX ORDER — SEMAGLUTIDE 0.25 MG/.5ML
0.25 INJECTION, SOLUTION SUBCUTANEOUS
Qty: 2 ML | Refills: 0 | Status: SHIPPED | OUTPATIENT
Start: 2023-11-21 | End: 2023-11-22 | Stop reason: ALTCHOICE

## 2023-11-21 NOTE — PROGRESS NOTES
Subjective   Patient ID: Brandy Lopez is a 58 y.o. female who presents for Follow-up and Weight Loss.    HPI     Patient is a 58-year-old female who presents with chief complaint of ongoing obesity.  Pt bmi of 39.  Gaining wt despite efforts to lose.  Gained 10 lbs.  Trying to eat less carbs.  Exercing more but not losing wt.  Pt interested in assistance with wt reduction.    Review of Systems  Constitutional: No fever or chills  Cardiovascular: no chest pain, no palpitations and no syncope.   Respiratory: no cough, no shortness of breath during exertion and no shortness of breath at rest.   Gastrointestinal: no abdominal pain, no nausea and no vomiting.  Neuro: No Headache, no dizziness    Objective   /76   Pulse 76   Wt 101 kg (223 lb)   BMI 38.88 kg/m²     Physical Exam  Constitutional: Alert and in no acute distress. Well developed, well nourished  Head and Face: Head and face: Normal.    Cardiovascular: Heart rate and rhythm were normal, normal S1 and S2. No peripheral edema.   Pulmonary: No respiratory distress. Clear bilateral breath sounds.  Musculoskeletal: Gait and station: Normal. Muscle strength/tone: Normal.   Skin: Normal skin color and pigmentation, normal skin turgor, and no rash.    Psychiatric: Judgment and insight: Intact. Mood and affect: Normal.     [unfilled]      Assessment/Plan   Problem List Items Addressed This Visit    None  Visit Diagnoses       BMI 39.0-39.9,adult    -  Primary    Relevant Medications    semaglutide, weight loss, (Wegovy) 0.25 mg/0.5 mL pen injector    Metabolic syndrome X        Relevant Medications    semaglutide, weight loss, (Wegovy) 0.25 mg/0.5 mL pen injector

## 2023-11-21 NOTE — ASSESSMENT & PLAN NOTE
No improvement in wt despite exercising and dieting.  Will start wegovy   Discussed SE  Rtc 1 month

## 2023-11-22 DIAGNOSIS — E66.01 CLASS 2 SEVERE OBESITY DUE TO EXCESS CALORIES WITH SERIOUS COMORBIDITY AND BODY MASS INDEX (BMI) OF 37.0 TO 37.9 IN ADULT (MULTI): Primary | ICD-10-CM

## 2023-11-22 RX ORDER — TIRZEPATIDE 2.5 MG/.5ML
2.5 INJECTION, SOLUTION SUBCUTANEOUS
Qty: 2 ML | Refills: 0 | Status: SHIPPED | OUTPATIENT
Start: 2023-11-22 | End: 2023-12-08 | Stop reason: WASHOUT

## 2023-11-30 ENCOUNTER — APPOINTMENT (OUTPATIENT)
Dept: PRIMARY CARE | Facility: CLINIC | Age: 58
End: 2023-11-30
Payer: COMMERCIAL

## 2023-12-08 ENCOUNTER — OFFICE VISIT (OUTPATIENT)
Dept: PRIMARY CARE | Facility: CLINIC | Age: 58
End: 2023-12-08
Payer: COMMERCIAL

## 2023-12-08 VITALS
SYSTOLIC BLOOD PRESSURE: 135 MMHG | DIASTOLIC BLOOD PRESSURE: 71 MMHG | HEART RATE: 61 BPM | BODY MASS INDEX: 38.01 KG/M2 | WEIGHT: 218 LBS

## 2023-12-08 DIAGNOSIS — I10 PRIMARY HYPERTENSION: Primary | ICD-10-CM

## 2023-12-08 DIAGNOSIS — E66.01 CLASS 2 SEVERE OBESITY DUE TO EXCESS CALORIES WITH SERIOUS COMORBIDITY AND BODY MASS INDEX (BMI) OF 37.0 TO 37.9 IN ADULT (MULTI): ICD-10-CM

## 2023-12-08 DIAGNOSIS — E88.810 METABOLIC SYNDROME X: ICD-10-CM

## 2023-12-08 PROCEDURE — 1036F TOBACCO NON-USER: CPT | Performed by: INTERNAL MEDICINE

## 2023-12-08 PROCEDURE — 3008F BODY MASS INDEX DOCD: CPT | Performed by: INTERNAL MEDICINE

## 2023-12-08 PROCEDURE — 99213 OFFICE O/P EST LOW 20 MIN: CPT | Performed by: INTERNAL MEDICINE

## 2023-12-08 PROCEDURE — 3075F SYST BP GE 130 - 139MM HG: CPT | Performed by: INTERNAL MEDICINE

## 2023-12-08 PROCEDURE — 3078F DIAST BP <80 MM HG: CPT | Performed by: INTERNAL MEDICINE

## 2023-12-08 RX ORDER — TIRZEPATIDE 5 MG/.5ML
5 INJECTION, SOLUTION SUBCUTANEOUS
Qty: 2 ML | Refills: 1 | Status: SHIPPED | OUTPATIENT
Start: 2023-12-08 | End: 2024-02-04

## 2023-12-08 NOTE — PROGRESS NOTES
Subjective   Patient ID: Brandy Lopez is a 58 y.o. female who presents for Follow-up.    HPI     Patient is a 58-year-old female who presents with chief complaint of ongoing obesity.  Pt bmi of 39.  Gaining wt despite efforts to lose.  Gained 10 lbs.  Trying to eat less carbs.  Exercing more but not losing wt.  Pt interested in assistance with wt reduction.  Lost 5 lbs on initial dosing of mounjaro     Review of Systems  Constitutional: No fever or chills  Cardiovascular: no chest pain, no palpitations and no syncope.   Respiratory: no cough, no shortness of breath during exertion and no shortness of breath at rest.   Gastrointestinal: no abdominal pain, no nausea and no vomiting.  Neuro: No Headache, no dizziness    Objective   /71   Pulse 61   Wt 98.9 kg (218 lb)   BMI 38.01 kg/m²     Physical Exam  Constitutional: Alert and in no acute distress. Well developed, well nourished  Head and Face: Head and face: Normal.    Cardiovascular: Heart rate and rhythm were normal, normal S1 and S2. No peripheral edema.   Pulmonary: No respiratory distress. Clear bilateral breath sounds.  Musculoskeletal: Gait and station: Normal. Muscle strength/tone: Normal.   Skin: Normal skin color and pigmentation, normal skin turgor, and no rash.    Psychiatric: Judgment and insight: Intact. Mood and affect: Normal.     [unfilled]      Assessment/Plan   Problem List Items Addressed This Visit       Class 2 severe obesity with serious comorbidity and body mass index (BMI) of 37.0 to 37.9 in adult (CMS/HCC)    Metabolic syndrome X    Primary hypertension - Primary

## 2023-12-11 ENCOUNTER — TELEMEDICINE (OUTPATIENT)
Dept: NEUROLOGY | Facility: CLINIC | Age: 58
End: 2023-12-11
Payer: COMMERCIAL

## 2023-12-11 DIAGNOSIS — R42 VERTIGO: Primary | ICD-10-CM

## 2023-12-11 PROCEDURE — 99213 OFFICE O/P EST LOW 20 MIN: CPT | Performed by: PSYCHIATRY & NEUROLOGY

## 2023-12-11 RX ORDER — MAGNESIUM GLUCONATE 30 MG(550)
30 TABLET ORAL DAILY
Qty: 30 TABLET | Refills: 5 | Status: SHIPPED | OUTPATIENT
Start: 2023-12-11 | End: 2024-06-08

## 2023-12-11 NOTE — PROGRESS NOTES
Subjective     Brandy Lopez is a 58 y.o. year old female seen in follow-up for lightheadedness    HPI    This follow-up visit was conducted as an audio and visual telehealth clinical encounter. The patient was informed about the telehealth clinical encounter including benefits to avoiding travel, limitations of the assessment, and billing for the service.  In-office care may be recommended if needed.  Telehealth sessions are not being recorded and personal health information is protected.  All questions were answered and verbal consent from the patient (or guardian) was obtained to proceed.    58-year-old right-handed  woman with past medical history significant for hysterectomy, right knee replacement, remote former cigarette smoking, anxiety and depression.     I evaluated her initially on 4/14/2023. As detailed in my ambulatory EMR note from that date she presented with a history compatible with migraine with visual aura and vertigo. Onset of symptoms was about 3 years previous. I reviewed her brain MRI from 3/3/2022 showing minimal burden of nonspecific FLAIR white matter hyperintensities without other notable findings.     I recommended starting riboflavin 400 mg daily as a nutraceutical.    I evaluated her most recently on 8/31/2023.  That was limited to a telephonic visit due to to technical difficulties with the A/V platform.  She reported a gratifying response of headaches, vertigo and visual aura episodes to riboflavin.    She contacted the office this morning about worsened dizziness and as there was an opening she was scheduled in for a follow-up visit.  She was offered an office appointment but preferred an audiovisual visit.  Today the A/V platform worked adequately for the purposes of the visit.    She indicates onset of symptoms 5 days ago.  She describes lightheadedness with a feeling of being presyncopal on standing, but also feeling this way if she turns her head rapidly while seated.   She does not specifically perceive spinning vertigo.  After symptoms came on 5 days ago they improved the following day, until 12/9 on which date the symptoms returned resulting in her being in bed all weekend.  Any effort to get out of bed made her dizzy in a lightheaded fashion.  She has accordingly been off work today.    She has been staying well-hydrated.  She drinks a lot of water.  She does not restrict salt.    She has been on Mounjaro for weight loss but for about a month and the symptoms just started within the past 5 days.  She has continued to eat 3 meals a day but smaller meals.    She has taken meclizine 25 mg but it makes her tired so she has to limit how often she uses it.  She has not tried splitting the 25 mg tablets down to a lower dose.    She indicates no obvious triggering circumstance for the recent onset of lightheadedness.  She denies any recent head trauma.    She continues on riboflavin 400 mg daily and continues to note benefit.  She has had a minor headache recently but it is responsive to ibuprofen.  She has had no recent debilitating headaches.  She has had no recent episodes of visual aura.    She underwent LASIK surgery on 9/7.      Review of Systems    As per the history of present illness    Patient Active Problem List   Diagnosis    Anxiety and depression    Dyspareunia, female    Elevated LDL cholesterol level    Headache    Hypermetropia of both eyes    Insomnia    Myopia of both eyes with regular astigmatism    Ophthalmic migraine    Primary osteoarthritis of left knee    Class 2 severe obesity with serious comorbidity and body mass index (BMI) of 37.0 to 37.9 in adult (CMS/[facility])    Acute bronchiolitis    Metabolic syndrome X    Primary hypertension     Past Medical History:   Diagnosis Date    Acute candidiasis of vulva and vagina 10/12/2016    Vaginitis due to Candida albicans    Acute vaginitis 10/22/2015    Bacterial vaginosis    Encounter for full-term uncomplicated  delivery     Normal vaginal delivery    Encounter for gynecological examination (general) (routine) without abnormal findings 08/02/2018    Well female exam with routine gynecological exam    Encounter for screening for osteoporosis 10/22/2015    Screening for osteoporosis    Encounter for supervision of normal pregnancy, unspecified, first trimester 11/30/2016    Encounter for pregnancy related examination in first trimester    Other specified noninflammatory disorders of vagina 10/22/2015    Vaginal dryness    Pain in unspecified toe(s) 08/31/2015    Toe pain    Personal history of diseases of the skin and subcutaneous tissue 07/02/2014    History of contact dermatitis    Personal history of other (healed) physical injury and trauma 10/18/2018    History of strain    Personal history of other diseases of the female genital tract 09/15/2014    History of endometriosis    Personal history of other diseases of the nervous system and sense organs 03/03/2014    History of eustachian tube dysfunction    Personal history of other diseases of the respiratory system 03/03/2014    History of acute pharyngitis    Personal history of other endocrine, nutritional and metabolic disease 01/08/2014    History of vitamin D deficiency    Personal history of other infectious and parasitic diseases 06/14/2013    History of herpes zoster    Personal history of other infectious and parasitic diseases     History of chicken pox    Personal history of other mental and behavioral disorders 03/31/2016    History of anxiety    Plantar fascial fibromatosis 09/09/2013    Plantar fasciitis    Pruritus vulvae 10/22/2015    Pruritus vulvae     Past Surgical History:   Procedure Laterality Date    TOTAL ABDOMINAL HYSTERECTOMY W/ BILATERAL SALPINGOOPHORECTOMY  06/14/2013    Total Abdominal Hysterectomy With Removal Of Both Ovaries     Social History     Tobacco Use    Smoking status: Former     Types: Cigarettes     Passive exposure: Past     Smokeless tobacco: Never   Substance Use Topics    Alcohol use: Yes     Alcohol/week: 4.0 standard drinks of alcohol     Types: 1 Glasses of wine, 1 Cans of beer, 2 Standard drinks or equivalent per week     family history includes Breast cancer in her mother; Heart disease in her maternal grandfather and maternal grandmother; Hypothyroidism in her sister; Polycystic ovary syndrome in her daughter.    Current Outpatient Medications:     albuterol (ProAir HFA) 90 mcg/actuation inhaler, Inhale 2 puffs every 4 hours if needed for wheezing or shortness of breath., Disp: 8.5 g, Rfl: 0    albuterol 90 mcg/actuation inhaler, Inhale 1-2 puffs every 4 hours if needed (bronchitis)., Disp: , Rfl:     calcium carbonate-vitamin D3 600 mg-5 mcg (200 unit) tablet, Take by mouth., Disp: , Rfl:     sertraline (Zoloft) 25 mg tablet, Take 1 tablet (25 mg) by mouth once daily., Disp: 90 tablet, Rfl: 1    tirzepatide (Mounjaro) 5 mg/0.5 mL pen injector, Inject 5 mg under the skin 1 (one) time per week., Disp: 2 mL, Rfl: 1  Allergies   Allergen Reactions    Other Other       Objective   Neurological Exam  Physical Exam    Mental status: Clear sensorium without fluctuation.  Appropriate in conversation.  Fluent unremarkable speech without paraphasic errors.    Cranial nerves: Extraocular movements were intact and conjugate without nystagmus.  No ptosis.  Symmetrically intact facial motor function.  Grossly intact hearing.  No dysarthria or dysphonia.  Midline tongue protrusion.    Coordination: Finger-to-nose was accurate bilaterally without dysmetria or intention tremor.      Assessment/Plan     Today's visit was limited to an audiovisual evaluation as was the patient's request, and consequently I was not able to check orthostatic vital signs.  She describes recent onset of lightheaded and dizziness at times with a presyncopal sensation.    However, we discussed that as she experiences dizziness even if she turns her head rapidly while  seated, that is somewhat more suspicious for a vertiginous basis.  She has a past history of vertigo, likely as a migraine equivalent.  It did respond initially to riboflavin, which continues to control her headaches and visual aura as well.    As such I recommended that she continue riboflavin but ad magnesium gluconate 550 mg daily as another nutraceutical for migrainous symptoms including migrainous vertigo.  I cautioned her that if magnesium causes diarrhea or loose bowel movements at this dose she should split the tablets in half and taking half dose.    I advised her to try splitting meclizine down to 12.5 mg dose and she can try this as needed, as the 25 mg dose has been too sedating.    I advised her to continue being attentive to her fluid intake.    I asked her to contact me via the office phone or [employer] message within the next week or 2 if she is not noting improvement in her dizziness.

## 2023-12-14 ENCOUNTER — OFFICE VISIT (OUTPATIENT)
Dept: OPHTHALMOLOGY | Facility: CLINIC | Age: 58
End: 2023-12-14
Payer: COMMERCIAL

## 2023-12-14 ASSESSMENT — REFRACTION_WEARINGRX
OD_CYLINDER: -0.50
OS_AXIS: 145
OS_ADD: +2.25
OD_SPHERE: +1.00
OS_SPHERE: +0.75
OS_CYLINDER: -0.50
OD_ADD: +2.25
OD_AXIS: 035

## 2023-12-18 RX ORDER — TIRZEPATIDE 2.5 MG/.5ML
2.5 INJECTION, SOLUTION SUBCUTANEOUS
OUTPATIENT
Start: 2023-12-18

## 2024-02-01 DIAGNOSIS — E66.01 CLASS 2 SEVERE OBESITY DUE TO EXCESS CALORIES WITH SERIOUS COMORBIDITY AND BODY MASS INDEX (BMI) OF 37.0 TO 37.9 IN ADULT (MULTI): ICD-10-CM

## 2024-02-04 RX ORDER — TIRZEPATIDE 5 MG/.5ML
INJECTION, SOLUTION SUBCUTANEOUS
Qty: 2 ML | Refills: 0 | Status: SHIPPED | OUTPATIENT
Start: 2024-02-04 | End: 2024-02-08 | Stop reason: WASHOUT

## 2024-02-08 ENCOUNTER — OFFICE VISIT (OUTPATIENT)
Dept: PRIMARY CARE | Facility: CLINIC | Age: 59
End: 2024-02-08
Payer: COMMERCIAL

## 2024-02-08 VITALS
DIASTOLIC BLOOD PRESSURE: 74 MMHG | BODY MASS INDEX: 36.62 KG/M2 | SYSTOLIC BLOOD PRESSURE: 127 MMHG | WEIGHT: 210 LBS | HEART RATE: 75 BPM | OXYGEN SATURATION: 96 %

## 2024-02-08 DIAGNOSIS — F32.A ANXIETY AND DEPRESSION: Primary | ICD-10-CM

## 2024-02-08 DIAGNOSIS — E88.810 METABOLIC SYNDROME X: ICD-10-CM

## 2024-02-08 DIAGNOSIS — I10 PRIMARY HYPERTENSION: ICD-10-CM

## 2024-02-08 DIAGNOSIS — F41.9 ANXIETY AND DEPRESSION: Primary | ICD-10-CM

## 2024-02-08 DIAGNOSIS — E66.01 CLASS 2 SEVERE OBESITY DUE TO EXCESS CALORIES WITH SERIOUS COMORBIDITY AND BODY MASS INDEX (BMI) OF 37.0 TO 37.9 IN ADULT (MULTI): ICD-10-CM

## 2024-02-08 PROCEDURE — 3078F DIAST BP <80 MM HG: CPT | Performed by: INTERNAL MEDICINE

## 2024-02-08 PROCEDURE — 1036F TOBACCO NON-USER: CPT | Performed by: INTERNAL MEDICINE

## 2024-02-08 PROCEDURE — 99213 OFFICE O/P EST LOW 20 MIN: CPT | Performed by: INTERNAL MEDICINE

## 2024-02-08 PROCEDURE — 3008F BODY MASS INDEX DOCD: CPT | Performed by: INTERNAL MEDICINE

## 2024-02-08 PROCEDURE — 3074F SYST BP LT 130 MM HG: CPT | Performed by: INTERNAL MEDICINE

## 2024-02-08 RX ORDER — TIRZEPATIDE 7.5 MG/.5ML
7.5 INJECTION, SOLUTION SUBCUTANEOUS
Qty: 2 ML | Refills: 1 | Status: SHIPPED | OUTPATIENT
Start: 2024-02-08 | End: 2024-04-10

## 2024-02-08 NOTE — ASSESSMENT & PLAN NOTE
Significant improvement of symptoms with the weight reduction.  Continue with weight reduction efforts and congratulations on improving her health.

## 2024-02-08 NOTE — ASSESSMENT & PLAN NOTE
Significant weight reduction on the Mounjaro.  Continue with dietary modifications and will increase the dose of the Mounjaro to 7.5.  Follow-up 3 months

## 2024-02-08 NOTE — PROGRESS NOTES
Subjective   Patient ID: Brandy Lopez is a 58 y.o. female who presents for Follow-up.    HPI     Patient is a 58-year-old female who presents with chief complaint of ongoing obesity.  Pt bmi of 36 (previously 39).  She is not on Mounjaro.  She is lost 18 pounds.  She is tolerating the medication well.  She has not been able to exercise very much due to her plantar fasciitis but she has changed her shoes to help alleviate some of the pain so that she can remain active.  She is cut out carbs in her diet.      Review of Systems  Constitutional: No fever or chills  Cardiovascular: no chest pain, no palpitations and no syncope.   Respiratory: no cough, no shortness of breath during exertion and no shortness of breath at rest.   Gastrointestinal: no abdominal pain, no nausea and no vomiting.  Neuro: No Headache, no dizziness    Objective   /74   Pulse 75   Wt 95.3 kg (210 lb)   SpO2 96%   BMI 36.62 kg/m²     Physical Exam  Constitutional: Alert and in no acute distress. Well developed, well nourished  Head and Face: Head and face: Normal.    Cardiovascular: Heart rate and rhythm were normal, normal S1 and S2. No peripheral edema.   Pulmonary: No respiratory distress. Clear bilateral breath sounds.  Musculoskeletal: Gait and station: Normal. Muscle strength/tone: Normal.   Skin: Normal skin color and pigmentation, normal skin turgor, and no rash.    Psychiatric: Judgment and insight: Intact. Mood and affect: Normal.     [unfilled]      Assessment/Plan   Problem List Items Addressed This Visit       Anxiety and depression - Primary     Significant improvement of symptoms with the weight reduction.  Continue with weight reduction efforts and congratulations on improving her health.         Class 2 severe obesity with serious comorbidity and body mass index (BMI) of 37.0 to 37.9 in adult (CMS/HCC)     Significant weight reduction on the Mounjaro.  Continue with dietary modifications and will increase the dose  of the Mounjaro to 7.5.  Follow-up 3 months         Relevant Orders    Follow Up In [facility] - PCP - Established    Metabolic syndrome X    Relevant Medications    tirzepatide (Mounjaro) 7.5 mg/0.5 mL pen injector    Other Relevant Orders    Follow Up [facility] - PCP - Established    Primary hypertension     Better controlled on current medications as well as with weight reduction.  Continue to monitor         Relevant Orders    Follow Up In [facility] - PCP - Established

## 2024-03-29 ENCOUNTER — TELEPHONE (OUTPATIENT)
Dept: NEUROLOGY | Facility: CLINIC | Age: 59
End: 2024-03-29
Payer: COMMERCIAL

## 2024-04-07 DIAGNOSIS — E88.810 METABOLIC SYNDROME X: ICD-10-CM

## 2024-04-08 ENCOUNTER — TELEMEDICINE (OUTPATIENT)
Dept: NEUROLOGY | Facility: CLINIC | Age: 59
End: 2024-04-08
Payer: COMMERCIAL

## 2024-04-08 DIAGNOSIS — G43.109 MIGRAINE WITH VISUAL AURA: Primary | ICD-10-CM

## 2024-04-08 PROCEDURE — 99212 OFFICE O/P EST SF 10 MIN: CPT | Performed by: PSYCHIATRY & NEUROLOGY

## 2024-04-08 PROCEDURE — 3008F BODY MASS INDEX DOCD: CPT | Performed by: PSYCHIATRY & NEUROLOGY

## 2024-04-08 NOTE — PROGRESS NOTES
Subjective     Brandy Lopez is a 58 y.o. year old female seen in follow-up for migraine with visual aura, lightheadedness.    HPI    This follow-up visit was conducted as an audio and visual telehealth clinical encounter. The patient was informed about the telehealth clinical encounter including benefits to avoiding travel, limitations of the assessment, and billing for the service.  In-office care may be recommended if needed.  Telehealth sessions are not being recorded and personal health information is protected.  All questions were answered and verbal consent from the patient (or guardian) was obtained to proceed.    58-year-old right-handed woman with past medical history significant for hysterectomy, right knee replacement, remote former cigarette smoking, anxiety and depression.     I evaluated her initially on 4/14/2023. As detailed in my ambulatory EMR note from that date she presented with a history compatible with migraine with visual aura and vertigo. Onset of symptoms was about 3 years previous. I reviewed her brain MRI from 3/3/2022 showing minimal burden of nonspecific FLAIR white matter hyperintensities without other notable findings.     I recommended starting riboflavin 400 mg daily as a nutraceutical.     I evaluated her again on 8/31/2023.  That was limited to a telephonic visit due to to technical difficulties with the A/V platform.  She reported a gratifying response of headaches, vertigo and visual aura episodes to riboflavin.    I evaluated her most recently on 5/11/2023 (by A/V visit at the patient's request) and she endorsed lightheadedness at times with a presyncopal nature.  She also endorsed vertiginous dizziness brought on by turning her head rapidly while seated.  I recommended continuing riboflavin and adding magnesium gluconate 550 mg daily as a nutraceutical, suspecting a component of migrainous vertigo.  I advised her to continue to be conscientious about maintaining adequate  fluid intake.  I recommended cutting meclizine down to a 12.5 mg dose as she found the 25 mg dose too sedating when taken for episodes of vertigo.    She is evaluated again today by A/V visit.    She has done relatively well since the last visit except she has had 2 episodes of migraine with visual aura within the space of last week.  She suspects that unstable weather was a contributing factor.    The recent episodes of migraine with visual aura were entirely typical for her.  There was a 20-minute visual aura which was either left monocular or left homonymous, and which appeared very similar to her previous auras.  This was followed by a dull ache which was only about 4/10 intensity and lasted for a few hours.  She took ibuprofen 800 mg with reasonable response.    She noted no associated nausea or vomiting with the above episodes.    Her baseline vision is stable and without concerns.    She denies any further lightheaded/dizzy episodes since the last visit.    Her last previous migraine with visual aura was about a year ago.    She continues on riboflavin 400 mg daily and magnesium 500 mg daily.  She feels that the addition of magnesium has been beneficial.    Review of Systems    As per the history of present illness    Patient Active Problem List   Diagnosis    Anxiety and depression    Dyspareunia, female    Elevated LDL cholesterol level    Headache    Hypermetropia of both eyes    Insomnia    Myopia of both eyes with regular astigmatism    Ophthalmic migraine    Primary osteoarthritis of left knee    Class 2 severe obesity with serious comorbidity and body mass index (BMI) of 37.0 to 37.9 in adult (CMS/[facility])    Acute bronchiolitis    Metabolic syndrome X    Primary hypertension     Past Medical History:   Diagnosis Date    Acute candidiasis of vulva and vagina 10/12/2016    Vaginitis due to Candida albicans    Acute vaginitis 10/22/2015    Bacterial vaginosis    Encounter for full-term uncomplicated delivery      Normal vaginal delivery    Encounter for gynecological examination (general) (routine) without abnormal findings 08/02/2018    Well female exam with routine gynecological exam    Encounter for screening for osteoporosis 10/22/2015    Screening for osteoporosis    Encounter for supervision of normal pregnancy, unspecified, first trimester 11/30/2016    Encounter for pregnancy related examination in first trimester    Other specified noninflammatory disorders of vagina 10/22/2015    Vaginal dryness    Pain in unspecified toe(s) 08/31/2015    Toe pain    Personal history of diseases of the skin and subcutaneous tissue 07/02/2014    History of contact dermatitis    Personal history of other (healed) physical injury and trauma 10/18/2018    History of strain    Personal history of other diseases of the female genital tract 09/15/2014    History of endometriosis    Personal history of other diseases of the nervous system and sense organs 03/03/2014    History of eustachian tube dysfunction    Personal history of other diseases of the respiratory system 03/03/2014    History of acute pharyngitis    Personal history of other endocrine, nutritional and metabolic disease 01/08/2014    History of vitamin D deficiency    Personal history of other infectious and parasitic diseases 06/14/2013    History of herpes zoster    Personal history of other infectious and parasitic diseases     History of chicken pox    Personal history of other mental and behavioral disorders 03/31/2016    History of anxiety    Plantar fascial fibromatosis 09/09/2013    Plantar fasciitis    Pruritus vulvae 10/22/2015    Pruritus vulvae     Past Surgical History:   Procedure Laterality Date    TOTAL ABDOMINAL HYSTERECTOMY W/ BILATERAL SALPINGOOPHORECTOMY  06/14/2013    Total Abdominal Hysterectomy With Removal Of Both Ovaries     Social History     Tobacco Use    Smoking status: Former     Types: Cigarettes     Passive exposure: Past    Smokeless  tobacco: Never   Substance Use Topics    Alcohol use: Yes     Alcohol/week: 4.0 standard drinks of alcohol     Types: 1 Glasses of wine, 1 Cans of beer, 2 Standard drinks or equivalent per week     family history includes Breast cancer in her mother; Heart disease in her maternal grandfather and maternal grandmother; Hypothyroidism in her sister; Polycystic ovary syndrome in her daughter.    Current Outpatient Medications:     albuterol (ProAir HFA) 90 mcg/actuation inhaler, Inhale 2 puffs every 4 hours if needed for wheezing or shortness of breath., Disp: 8.5 g, Rfl: 0    albuterol 90 mcg/actuation inhaler, Inhale 1-2 puffs every 4 hours if needed (bronchitis)., Disp: , Rfl:     calcium carbonate-vitamin D3 600 mg-5 mcg (200 unit) tablet, Take by mouth., Disp: , Rfl:     magnesium gluconate 30 mg (550 mg) tablet, Take 1 tablet (30 mg) by mouth once daily., Disp: 30 tablet, Rfl: 5    sertraline (Zoloft) 25 mg tablet, Take 1 tablet (25 mg) by mouth once daily., Disp: 90 tablet, Rfl: 1    tirzepatide (Mounjaro) 7.5 mg/0.5 mL pen injector, Inject 7.5 mg under the skin 1 (one) time per week., Disp: 2 mL, Rfl: 1  Allergies   Allergen Reactions    Other Other       Objective   Neurological Exam  Physical Exam    Mental status: Clear sensorium without fluctuation.  Appropriate in conversation.  Fluent unremarkable speech without paraphasic errors.    Cranial nerves: No gaze deviation or ptosis.  Symmetric facial motor function.  Grossly intact hearing.  No dysarthria or dysphonia.      Assessment/Plan     She is doing better from the standpoint of dizzy spells since adding magnesium to her nutraceutical regimen.  She has had 2 recent episodes of migraine with visual aura after a 1 year remission from such episodes.    We discussed that it seems likely the recent migraine attacks related at least in part to the very unstable weather conditions we have been experiencing.    We discussed additional nutraceutical options and  among these I suggested adding co-Q10 200 mg every morning if tolerated.  I advised her to continue riboflavin and magnesium.  She is to contact the office if she fails to note a response to co-Q10 after a period of weeks, or if she has trouble tolerating it.    She is scheduled to see me again in August.

## 2024-04-10 RX ORDER — TIRZEPATIDE 7.5 MG/.5ML
7.5 INJECTION, SOLUTION SUBCUTANEOUS
Qty: 2 ML | Refills: 0 | Status: SHIPPED | OUTPATIENT
Start: 2024-04-14 | End: 2024-05-06

## 2024-04-21 ENCOUNTER — PATIENT MESSAGE (OUTPATIENT)
Dept: PRIMARY CARE | Facility: CLINIC | Age: 59
End: 2024-04-21
Payer: COMMERCIAL

## 2024-04-21 DIAGNOSIS — E88.810 METABOLIC SYNDROME X: ICD-10-CM

## 2024-04-22 RX ORDER — TIRZEPATIDE 10 MG/.5ML
10 INJECTION, SOLUTION SUBCUTANEOUS
Qty: 2 ML | Refills: 1 | Status: SHIPPED | OUTPATIENT
Start: 2024-04-28

## 2024-05-06 DIAGNOSIS — E88.810 METABOLIC SYNDROME X: ICD-10-CM

## 2024-05-06 DIAGNOSIS — F41.9 ANXIETY AND DEPRESSION: ICD-10-CM

## 2024-05-06 DIAGNOSIS — F51.01 PRIMARY INSOMNIA: ICD-10-CM

## 2024-05-06 DIAGNOSIS — F32.A ANXIETY AND DEPRESSION: ICD-10-CM

## 2024-05-06 RX ORDER — SERTRALINE HYDROCHLORIDE 25 MG/1
25 TABLET, FILM COATED ORAL DAILY
Qty: 90 TABLET | Refills: 3 | Status: SHIPPED | OUTPATIENT
Start: 2024-05-06

## 2024-05-06 RX ORDER — TIRZEPATIDE 7.5 MG/.5ML
7.5 INJECTION, SOLUTION SUBCUTANEOUS
Qty: 2 ML | Refills: 0 | Status: SHIPPED | OUTPATIENT
Start: 2024-05-06 | End: 2024-06-10

## 2024-06-08 DIAGNOSIS — E88.810 METABOLIC SYNDROME X: ICD-10-CM

## 2024-06-10 RX ORDER — TIRZEPATIDE 7.5 MG/.5ML
7.5 INJECTION, SOLUTION SUBCUTANEOUS
Qty: 2 ML | Refills: 0 | Status: SHIPPED | OUTPATIENT
Start: 2024-06-16

## 2024-07-10 ENCOUNTER — APPOINTMENT (OUTPATIENT)
Dept: PRIMARY CARE | Facility: CLINIC | Age: 59
End: 2024-07-10
Payer: COMMERCIAL

## 2024-08-15 ENCOUNTER — APPOINTMENT (OUTPATIENT)
Dept: NEUROLOGY | Facility: CLINIC | Age: 59
End: 2024-08-15
Payer: COMMERCIAL

## 2024-08-20 ENCOUNTER — APPOINTMENT (OUTPATIENT)
Dept: PRIMARY CARE | Facility: CLINIC | Age: 59
End: 2024-08-20
Payer: COMMERCIAL

## 2024-08-20 VITALS
OXYGEN SATURATION: 96 % | WEIGHT: 208 LBS | HEART RATE: 66 BPM | SYSTOLIC BLOOD PRESSURE: 132 MMHG | BODY MASS INDEX: 36.27 KG/M2 | DIASTOLIC BLOOD PRESSURE: 74 MMHG

## 2024-08-20 DIAGNOSIS — I10 PRIMARY HYPERTENSION: ICD-10-CM

## 2024-08-20 DIAGNOSIS — E88.810 METABOLIC SYNDROME X: ICD-10-CM

## 2024-08-20 DIAGNOSIS — Z11.59 ENCOUNTER FOR HEPATITIS C SCREENING TEST FOR LOW RISK PATIENT: ICD-10-CM

## 2024-08-20 DIAGNOSIS — E78.00 ELEVATED LDL CHOLESTEROL LEVEL: ICD-10-CM

## 2024-08-20 DIAGNOSIS — Z12.31 SCREENING MAMMOGRAM FOR BREAST CANCER: Primary | ICD-10-CM

## 2024-08-20 DIAGNOSIS — Z00.00 HEALTH CARE MAINTENANCE: ICD-10-CM

## 2024-08-20 DIAGNOSIS — Z23 NEED FOR SHINGLES VACCINE: ICD-10-CM

## 2024-08-20 PROBLEM — N94.10 DYSPAREUNIA, FEMALE: Status: RESOLVED | Noted: 2023-03-01 | Resolved: 2024-08-20

## 2024-08-20 PROCEDURE — 3078F DIAST BP <80 MM HG: CPT | Performed by: INTERNAL MEDICINE

## 2024-08-20 PROCEDURE — 90471 IMMUNIZATION ADMIN: CPT | Performed by: INTERNAL MEDICINE

## 2024-08-20 PROCEDURE — 99396 PREV VISIT EST AGE 40-64: CPT | Performed by: INTERNAL MEDICINE

## 2024-08-20 PROCEDURE — 1036F TOBACCO NON-USER: CPT | Performed by: INTERNAL MEDICINE

## 2024-08-20 PROCEDURE — 3075F SYST BP GE 130 - 139MM HG: CPT | Performed by: INTERNAL MEDICINE

## 2024-08-20 PROCEDURE — 90750 HZV VACC RECOMBINANT IM: CPT | Performed by: INTERNAL MEDICINE

## 2024-08-20 RX ORDER — TIRZEPATIDE 5 MG/.5ML
5 INJECTION, SOLUTION SUBCUTANEOUS
Qty: 2 ML | Refills: 2 | Status: SHIPPED | OUTPATIENT
Start: 2024-08-25

## 2024-08-20 NOTE — ASSESSMENT & PLAN NOTE
Continue Mounjaro but will change the medication to 5 mg which appears to be more readily available

## 2024-08-20 NOTE — PROGRESS NOTES
Subjective   Patient ID: Brandy Lopez is a 59 y.o. female who presents for Follow-up.        HPI     Patient is a 59-year-old female with past medical history of hypertension dyslipidemia metabolic syndrome X obesity who presents for wellness.  No specific complaints at this time.  She is Mounjaro for treatment and has lost a significant amount of weight however has been unable to get the higher dose.  She is interested in going back to the lower dose.  Blood pressure well-controlled.   and monogamous.  Denies illicit substances or smoking.  Limited exercise.  Her boss recently quit and she is quite hopeful that the new boss will be an improvement.  Overall her mood is improving and she states the sertraline is quite helpful in maintaining regularity in her mood.    Review of Systems  Constitutional: No fever or chills, No Night Sweats  Eyes: No Blurry Vision or Eye sight problems  ENT: No Nasal Discharge, Hoarseness, sore throat  Cardiovascular: no chest pain, no palpitations and no syncope.   Respiratory: no cough, no shortness of breath during exertion and no shortness of breath at rest.   Gastrointestinal: no abdominal pain, no nausea and no vomiting.   : No vaginal discharge, burning with urination, no blood in urine or stools  Skin: No Skin rashes or Lesions  Neuro: No Headache, no dizziness or Numbness or tingling  Psych: No Anxiety, depression or sleeping problems  Heme: No Easy bleeding or brusing.     Objective   /74   Pulse 66   Wt 94.3 kg (208 lb)   SpO2 96%   BMI 36.27 kg/m²     Physical Exam  Patient declined Chaperone  Constitutional: Alert and in no acute distress. Well developed, well nourished.   Head and Face: Head and face: Normal.    Eyes: Normal external exam. Pupils were equal in size, round, reactive to light (PERRL) with normal accommodation and extraocular movements intact (EOMI).   Ears, Nose, Mouth, and Throat: External inspection of ears and nose: Normal.  Hearing:  Normal.  Nasal mucosa, septum, and turbinates: Normal.  Lips, teeth, and gums: Normal.  Oropharynx: Normal.   Neck: No neck mass was observed. Supple. Thyroid not enlarged and there were no palpable thyroid nodules.   Cardiovascular: Heart rate and rhythm were normal, normal S1 and S2. Pedal pulses: Normal. No peripheral edema.   Pulmonary: No respiratory distress. Clear bilateral breath sounds.   Breast: Normal Appearance, No Masses or lumps palpated  Abdomen: Soft nontender; no abdominal mass palpated. Normal bowel sounds. No organomegaly.   : Deferred   Musculoskeletal: No joint swelling seen, normal movements of all extremities. Range of motion: Normal.  Muscle strength/tone: Normal.    Skin: Normal skin color and pigmentation, normal skin turgor, and no rash.   Neurologic: Deep tendon reflexes were 2+ and symmetric.   Psychiatric: Judgment and insight: Intact. Mood and affect: Normal.  Lymphatic: No cervical lymphadenopathy. Palpation of lymph nodes in axillae: Normal.  Palpation of lymph nodes in groin: Normal.    Lab Results   Component Value Date    WBC 5.0 11/20/2023    HGB 13.6 11/20/2023    HCT 43.1 11/20/2023     11/20/2023    CHOL 204 (H) 11/20/2023    TRIG 129 11/20/2023    HDL 52.1 11/20/2023    ALT 15 11/20/2023    AST 11 11/20/2023     11/20/2023    K 4.4 11/20/2023     11/20/2023    CREATININE 0.68 11/20/2023    BUN 14 11/20/2023    CO2 28 11/20/2023    TSH 2.14 11/20/2023       Electrocardiogram 12 Lead  Normal sinus rhythm  Normal ECG  No previous ECGs available  Confirmed by SPENCER COREAS MD ([id number]) on 1/9/2019 10:07:24 PM      Assessment/Plan   Problem List Items Addressed This Visit             ICD-10-CM    Elevated LDL cholesterol level E78.00     Check LDL fasting.  Encourage Mediterranean diet         Metabolic syndrome X E88.810     Continue Mounjaro but will change the medication to 5 mg which appears to be more readily available         Relevant Medications     tirzepatide (Mounjaro) 5 mg/0.5 mL pen injector (Start on 8/25/2024)    Other Relevant Orders    Follow Up In [facility] - PCP - Established    Primary hypertension I10     Controlled on current medications.  No medication adjustments          Other Visit Diagnoses         Codes    Screening mammogram for breast cancer    -  Primary Z12.31    Relevant Orders    BI mammo bilateral screening tomosynthesis    Encounter for hepatitis C screening test for low risk patient     Z11.59    Relevant Orders    Hepatitis C antibody    Health care maintenance     Z00.00    Relevant Orders    Renal function panel    CBC    Lipid Panel    Need for shingles vaccine     Z23    Relevant Orders    Zoster vaccine, recombinant, adult (SHINGRIX)              Dear Brandy Lopez     It was my pleasure to take care of you today in the office. Below are the things we discussed today:    1. 1. Immunizations: Yearly Flu shot is recommended.          a: COVID: Up-to-Date         b: Tetanus: Up-to-date         c: Shingrix: Shingles vaccine today    2. Blood Work: Ordered  3. Seen your dentist twice a year  4. Yearly Eye exam is recommended    5. BMI: Less than 40  6: Diet recommendations:   Eat Clean, Try to have as many home cooked meals as possible  Avoid processed foods which contain excess calories, sugar, and sodium.    7. Exercise recommendations:   150 minutes a week to maintain your weight     If you have to loose weight, you need a better diet and exercise plan.     8. Supplements recommended:  a - Calcium 600 mg up to twice a day to get a total of 1200 mg. Each 8 oz of milk or yogurt or 1 oz of cheese, 1 Banana, 1 serving of green Leafy vegetable has about 300 mg of Calcium, so you may subtract that amount. Calcium citrate is the only acceptable supplement to take if you take an acid suppressing medication like Prilosec; otherwise Calcium carbonate is acceptable too (It can cause Constipation).   b - Vitamin D - 2000 IU  daily     9. Please get your Living will / Advance directive completed if you do not have one already. Please make sure our office has a copy of the latest one.     10. Colonoscopy: Uptodate  11. Mammogram: Ordered  12. PAP: Advise follow-up with gynecology  13. DEXA: N/A  14: Skin Check: Please see Dermatology once a year for a Skin Check.     15.  Follow-up 4 months    Follow up in one year for a Physical. Please call the office before your Physical to see if you need blood work completed prior to your physical.     Please call me if any questions arise from now until your next visit. I will call you after I am done seeing patients. A Doctor is always available by phone when the office is closed. Please feel free to call for help with any problem that you feel shouldn't wait until the office re-opens.     Pietro Sosa, DO

## 2024-08-29 ENCOUNTER — APPOINTMENT (OUTPATIENT)
Dept: NEUROLOGY | Facility: CLINIC | Age: 59
End: 2024-08-29
Payer: COMMERCIAL

## 2024-08-29 DIAGNOSIS — G43.109 MIGRAINE WITH VISUAL AURA: Primary | ICD-10-CM

## 2024-08-29 DIAGNOSIS — G43.109 MIGRAINOUS VERTIGO: ICD-10-CM

## 2024-08-29 PROCEDURE — 99213 OFFICE O/P EST LOW 20 MIN: CPT | Performed by: PSYCHIATRY & NEUROLOGY

## 2024-08-29 RX ORDER — TOPIRAMATE 25 MG/1
75 TABLET ORAL NIGHTLY
Qty: 90 TABLET | Refills: 3 | Status: SHIPPED | OUTPATIENT
Start: 2024-08-29

## 2024-08-29 NOTE — PROGRESS NOTES
Subjective     Brandy Lopez is a 59 y.o. year old female seen in follow-up for migraine with visual aura, vertigo.    HPI    This follow-up visit was conducted as an audio and visual telehealth clinical encounter. The patient was informed about the telehealth clinical encounter including benefits to avoiding travel, limitations of the assessment, and billing for the service.  In-office care may be recommended if needed.  Telehealth sessions are not being recorded and personal health information is protected.  All questions were answered and verbal consent from the patient (or guardian) was obtained to proceed.    59-year-old right-handed woman with past medical history significant for hysterectomy, right knee replacement, remote former cigarette smoking, anxiety and depression.     I evaluated her initially on 4/14/2023. As detailed in my ambulatory EMR note from that date she presented with a history compatible with migraine with visual aura and vertigo. Onset of symptoms was about 3 years previous. I reviewed her brain MRI from 3/3/2022 showing minimal burden of nonspecific FLAIR white matter hyperintensities without other notable findings.     I recommended starting riboflavin 400 mg daily as a nutraceutical.     I evaluated her again on 8/31/2023.  That was limited to a telephonic visit due to to technical difficulties with the A/V platform.  She reported a gratifying response of headaches, vertigo and visual aura episodes to riboflavin.     I evaluated her again on 5/11/2023 (by A/V visit at the patient's request) and she endorsed lightheadedness at times with a presyncopal nature.  She also endorsed vertiginous dizziness brought on by turning her head rapidly while seated.  I recommended continuing riboflavin and adding magnesium gluconate 550 mg daily as a nutraceutical, suspecting a component of migrainous vertigo.  I advised her to continue to be conscientious about maintaining adequate fluid intake.  I  recommended cutting meclizine down to a 12.5 mg dose as she found the 25 mg dose too sedating when taken for episodes of vertigo.      I evaluated her most recently on 4/8/2024 which was again an audiovisual visit.  She indicated improvement in dizzy spells since adding magnesium to her nutraceutical regimen.  She endorsed 2 recent episodes of migraine with visual aura after a 1 year remission from such episodes.  I suggested she consider adding co-Q10 200 mg every morning to her nutraceutical regimen.    She is evaluated again today by A/V visit.    She indicates only a single migraine with visual aura since the last visit, which occurred in the context of unstable weather with a dramatic temperature shift.    She has however been noting a somewhat increased frequency of nonmigrainous headaches, about 2-3 days/week on average.  These headaches are amenable to ibuprofen without need for other treatment.    She had a recent episode while camping out of state of incipient vertigo.  It did not develop into a full-blown attack but it made her very nervous and she took meclizine which seemed to help abort the episode.  She has not had other episodes of vertigo but she is very anxious about what would happen should she experience an episode while alone, driving or out of town, and how it might incapacitate her.    She is steady on her feet without interval falls and does not use assistive devices for ambulation.    She denies visual complaints.  She denies subjective hearing change or tinnitus.    She expresses interest in starting a conventional preventive medication.  She never started co-Q10 but she does continue on magnesium 500 mg daily and riboflavin 400 mg daily as before.    Review of Systems    As per the history of present illness    Patient Active Problem List   Diagnosis    Anxiety and depression    Elevated LDL cholesterol level    Headache    Hypermetropia of both eyes    Insomnia    Myopia of both eyes with  regular astigmatism    Ophthalmic migraine    Primary osteoarthritis of left knee    Class 2 severe obesity with serious comorbidity and body mass index (BMI) of 37.0 to 37.9 in adult (Multi)    Acute bronchiolitis    Metabolic syndrome X    Primary hypertension     Past Medical History:   Diagnosis Date    Acute candidiasis of vulva and vagina 10/12/2016    Vaginitis due to Candida albicans    Acute vaginitis 10/22/2015    Bacterial vaginosis    Encounter for full-term uncomplicated delivery ([facility])     Normal vaginal delivery    Encounter for gynecological examination (general) (routine) without abnormal findings 08/02/2018    Well female exam with routine gynecological exam    Encounter for screening for osteoporosis 10/22/2015    Screening for osteoporosis    Encounter for supervision of normal pregnancy, unspecified, first trimester ([facility]) 11/30/2016    Encounter for pregnancy related examination in first trimester    Other specified noninflammatory disorders of vagina 10/22/2015    Vaginal dryness    Pain in unspecified toe(s) 08/31/2015    Toe pain    Personal history of diseases of the skin and subcutaneous tissue 07/02/2014    History of contact dermatitis    Personal history of other (healed) physical injury and trauma 10/18/2018    History of strain    Personal history of other diseases of the female genital tract 09/15/2014    History of endometriosis    Personal history of other diseases of the nervous system and sense organs 03/03/2014    History of eustachian tube dysfunction    Personal history of other diseases of the respiratory system 03/03/2014    History of acute pharyngitis    Personal history of other endocrine, nutritional and metabolic disease 01/08/2014    History of vitamin D deficiency    Personal history of other infectious and parasitic diseases 06/14/2013    History of herpes zoster    Personal history of other infectious and parasitic diseases     History of chicken pox     Personal history of other mental and behavioral disorders 03/31/2016    History of anxiety    Plantar fascial fibromatosis 09/09/2013    Plantar fasciitis    Pruritus vulvae 10/22/2015    Pruritus vulvae     Past Surgical History:   Procedure Laterality Date    TOTAL ABDOMINAL HYSTERECTOMY W/ BILATERAL SALPINGOOPHORECTOMY  06/14/2013    Total Abdominal Hysterectomy With Removal Of Both Ovaries     Social History     Tobacco Use    Smoking status: Former     Types: Cigarettes     Passive exposure: Past    Smokeless tobacco: Never   Substance Use Topics    Alcohol use: Yes     Alcohol/week: 4.0 standard drinks of alcohol     Types: 1 Glasses of wine, 1 Cans of beer, 2 Standard drinks or equivalent per week     family history includes Breast cancer in her mother; Heart disease in her maternal grandfather and maternal grandmother; Hypothyroidism in her sister; Polycystic ovary syndrome in her daughter.    Current Outpatient Medications:     albuterol (ProAir HFA) 90 mcg/actuation inhaler, Inhale 2 puffs every 4 hours if needed for wheezing or shortness of breath., Disp: 8.5 g, Rfl: 0    albuterol 90 mcg/actuation inhaler, Inhale 1-2 puffs every 4 hours if needed (bronchitis)., Disp: , Rfl:     calcium carbonate-vitamin D3 600 mg-5 mcg (200 unit) tablet, Take by mouth., Disp: , Rfl:     sertraline (Zoloft) 25 mg tablet, TAKE 1 TABLET DAILY, Disp: 90 tablet, Rfl: 3    tirzepatide (Mounjaro) 5 mg/0.5 mL pen injector, Inject 5 mg under the skin 1 (one) time per week., Disp: 2 mL, Rfl: 2  Allergies   Allergen Reactions    Other Other       Objective   Neurological Exam  Physical Exam    Mental status: Clear sensorium without fluctuation.  Appropriate in conversation.  Fluent unremarkable speech without paraphasic errors.    Cranial nerves: No gaze deviation or ptosis.  Symmetric facial motor function.  Grossly intact hearing.  No dysarthria or dysphonia.      Assessment/Plan     Her frequency of migraine with visual aura and  episodes of migrainous vertigo remains low, although she has noted somewhat more nonmigraine's headache lately.    Despite the low vertigo frequency she is very anxious about what would happen should she experience an episode at an inconvenient time/location.    As such she expressed interest in starting a conventional migraine preventive agent.  I had previously discussed topiramate and I reviewed it with her again today.  I specifically reviewed potential for appetite suppression, weight loss, paresthesias, taste disturbance, cognitive side effects, kidney stones (of which she denies any past history), and blurry vision.  I checked topiramate against sertraline which she is currently taking and found no interaction.    I advised starting topiramate as 25 mg nightly with gradual titration to 75 mg as the initial target dose.  Assuming she tolerates titration I advised her to call after 4 weeks on the 75 mg dose.  She is to contact the office sooner if she has any trouble with the titration.    We will decide about further titration if indicated, and timing of her next visit, after she contacts me regarding her initial response to topiramate.    I advised her in the meantime to continue her nutraceutical regimen of magnesium and riboflavin.  She additionally has meclizine for as needed use for vertigo attacks.

## 2024-09-03 ENCOUNTER — APPOINTMENT (OUTPATIENT)
Dept: PRIMARY CARE | Facility: CLINIC | Age: 59
End: 2024-09-03
Payer: COMMERCIAL

## 2024-09-04 ENCOUNTER — APPOINTMENT (OUTPATIENT)
Dept: RADIOLOGY | Facility: CLINIC | Age: 59
End: 2024-09-04
Payer: COMMERCIAL

## 2024-09-12 DIAGNOSIS — E88.810 METABOLIC SYNDROME X: ICD-10-CM

## 2024-09-13 RX ORDER — TIRZEPATIDE 7.5 MG/.5ML
7.5 INJECTION, SOLUTION SUBCUTANEOUS
Refills: 1 | OUTPATIENT
Start: 2024-09-15

## 2024-09-17 ENCOUNTER — APPOINTMENT (OUTPATIENT)
Dept: RADIOLOGY | Facility: CLINIC | Age: 59
End: 2024-09-17
Payer: COMMERCIAL

## 2024-10-11 ENCOUNTER — HOSPITAL ENCOUNTER (OUTPATIENT)
Dept: RADIOLOGY | Facility: CLINIC | Age: 59
Discharge: HOME | End: 2024-10-11
Payer: COMMERCIAL

## 2024-10-11 VITALS — BODY MASS INDEX: 35.44 KG/M2 | HEIGHT: 63 IN | WEIGHT: 200 LBS

## 2024-10-11 DIAGNOSIS — Z12.31 SCREENING MAMMOGRAM FOR BREAST CANCER: ICD-10-CM

## 2024-10-11 PROCEDURE — 77067 SCR MAMMO BI INCL CAD: CPT

## 2024-10-11 PROCEDURE — 77067 SCR MAMMO BI INCL CAD: CPT | Performed by: RADIOLOGY

## 2024-10-11 PROCEDURE — 77063 BREAST TOMOSYNTHESIS BI: CPT | Performed by: RADIOLOGY

## 2024-11-09 DIAGNOSIS — E88.810 METABOLIC SYNDROME X: ICD-10-CM

## 2024-11-11 RX ORDER — TIRZEPATIDE 5 MG/.5ML
5 INJECTION, SOLUTION SUBCUTANEOUS WEEKLY
Qty: 2 ML | Refills: 0 | Status: SHIPPED | OUTPATIENT
Start: 2024-11-11

## 2024-11-26 DIAGNOSIS — G43.109 MIGRAINE WITH VISUAL AURA: ICD-10-CM

## 2024-11-26 DIAGNOSIS — G43.109 MIGRAINOUS VERTIGO: ICD-10-CM

## 2024-11-26 RX ORDER — TOPIRAMATE 25 MG/1
75 TABLET ORAL NIGHTLY
Qty: 90 TABLET | Refills: 3 | Status: SHIPPED | OUTPATIENT
Start: 2024-11-26

## 2024-12-16 DIAGNOSIS — E88.810 METABOLIC SYNDROME X: ICD-10-CM

## 2024-12-17 DIAGNOSIS — E88.810 METABOLIC SYNDROME X: ICD-10-CM

## 2024-12-17 RX ORDER — TIRZEPATIDE 5 MG/.5ML
5 INJECTION, SOLUTION SUBCUTANEOUS WEEKLY
Qty: 2 ML | Refills: 1 | Status: SHIPPED | OUTPATIENT
Start: 2024-12-17 | End: 2024-12-17 | Stop reason: SDUPTHER

## 2024-12-17 RX ORDER — TIRZEPATIDE 5 MG/.5ML
5 INJECTION, SOLUTION SUBCUTANEOUS WEEKLY
Qty: 2 ML | Refills: 0 | OUTPATIENT
Start: 2024-12-17

## 2024-12-17 RX ORDER — TIRZEPATIDE 7.5 MG/.5ML
7.5 INJECTION, SOLUTION SUBCUTANEOUS
Refills: 0 | OUTPATIENT
Start: 2024-12-22

## 2024-12-20 ENCOUNTER — OFFICE VISIT (OUTPATIENT)
Dept: PRIMARY CARE | Facility: CLINIC | Age: 59
End: 2024-12-20
Payer: COMMERCIAL

## 2024-12-20 VITALS
HEART RATE: 64 BPM | DIASTOLIC BLOOD PRESSURE: 77 MMHG | BODY MASS INDEX: 34.19 KG/M2 | WEIGHT: 193 LBS | OXYGEN SATURATION: 97 % | SYSTOLIC BLOOD PRESSURE: 122 MMHG

## 2024-12-20 DIAGNOSIS — E88.810 METABOLIC SYNDROME X: ICD-10-CM

## 2024-12-20 PROCEDURE — 1036F TOBACCO NON-USER: CPT | Performed by: INTERNAL MEDICINE

## 2024-12-20 PROCEDURE — 99213 OFFICE O/P EST LOW 20 MIN: CPT | Performed by: INTERNAL MEDICINE

## 2024-12-20 PROCEDURE — 3078F DIAST BP <80 MM HG: CPT | Performed by: INTERNAL MEDICINE

## 2024-12-20 PROCEDURE — 3074F SYST BP LT 130 MM HG: CPT | Performed by: INTERNAL MEDICINE

## 2024-12-20 RX ORDER — TIRZEPATIDE 5 MG/.5ML
5 INJECTION, SOLUTION SUBCUTANEOUS WEEKLY
Qty: 6 ML | Refills: 0 | Status: SHIPPED | OUTPATIENT
Start: 2024-12-20

## 2024-12-20 NOTE — PROGRESS NOTES
Subjective   Patient ID: Brandy Lopez is a 59 y.o. female who presents for Follow-up.        HPI     Patient is a 59-year-old female with past medical history of hypertension dyslipidemia metabolic syndrome X obesity who presents for follow-up.  Patient started on Mounjaro for treatment of obesity.  She is lost a substantial amount of weight since initiating the Mounjaro.  Currently on 5 mg on a daily basis.  No side effects.  Tolerating well.  Wishes to continue.  Patient has been making efforts to lose weight including increasing exercise and decreasing caloric intake.  Overallher mood is much improved    Review of Systems  Constitutional: No fever or chills, No Night Sweats  Eyes: No Blurry Vision or Eye sight problems  ENT: No Nasal Discharge, Hoarseness, sore throat  Cardiovascular: no chest pain, no palpitations and no syncope.   Respiratory: no cough, no shortness of breath during exertion and no shortness of breath at rest.   Gastrointestinal: no abdominal pain, no nausea and no vomiting.   : No vaginal discharge, burning with urination, no blood in urine or stools  Skin: No Skin rashes or Lesions  Neuro: No Headache, no dizziness or Numbness or tingling  Psych: No Anxiety, depression or sleeping problems  Heme: No Easy bleeding or brusing.     Objective   /77   Pulse 64   Wt 87.5 kg (193 lb)   SpO2 97%   BMI 34.19 kg/m²     Physical Exam  Patient declined Chaperone  Constitutional: Alert and in no acute distress. Well developed, well nourished.   Head and Face: Head and face: Normal.    Eyes: Normal external exam. Pupils were equal in size, round, reactive to light (PERRL) with normal accommodation and extraocular movements intact (EOMI).   Ears, Nose, Mouth, and Throat: External inspection of ears and nose: Normal.  Hearing: Normal.  Nasal mucosa, septum, and turbinates: Normal.  Lips, teeth, and gums: Normal.  Oropharynx: Normal.   Neck: No neck mass was observed. Supple. Thyroid not enlarged  and there were no palpable thyroid nodules.   Cardiovascular: Heart rate and rhythm were normal, normal S1 and S2. Pedal pulses: Normal. No peripheral edema.   Pulmonary: No respiratory distress. Clear bilateral breath sounds.   Breast: Normal Appearance, No Masses or lumps palpated  Abdomen: Soft nontender; no abdominal mass palpated. Normal bowel sounds. No organomegaly.   : Deferred   Musculoskeletal: No joint swelling seen, normal movements of all extremities. Range of motion: Normal.  Muscle strength/tone: Normal.    Skin: Normal skin color and pigmentation, normal skin turgor, and no rash.   Neurologic: Deep tendon reflexes were 2+ and symmetric.   Psychiatric: Judgment and insight: Intact. Mood and affect: Normal.  Lymphatic: No cervical lymphadenopathy. Palpation of lymph nodes in axillae: Normal.  Palpation of lymph nodes in groin: Normal.    Lab Results   Component Value Date    WBC 5.0 11/20/2023    HGB 13.6 11/20/2023    HCT 43.1 11/20/2023     11/20/2023    CHOL 204 (H) 11/20/2023    TRIG 129 11/20/2023    HDL 52.1 11/20/2023    ALT 15 11/20/2023    AST 11 11/20/2023     11/20/2023    K 4.4 11/20/2023     11/20/2023    CREATININE 0.68 11/20/2023    BUN 14 11/20/2023    CO2 28 11/20/2023    TSH 2.14 11/20/2023       BI mammo bilateral screening tomosynthesis  Narrative: Interpreted By:  Treva Agarwal,   STUDY:  BI MAMMO BILATERAL SCREENING TOMOSYNTHESIS;  10/11/2024 6:56 am      ACCESSION NUMBER(S):  [id number]      ORDERING CLINICIAN:  NYASIA DAMON      INDICATION:  Screening.      ,Z12.31 Encounter for screening mammogram for malignant neoplasm of  breast      COMPARISON:  03/03/2022 10/26/2018      FINDINGS:  2D and tomosynthesis images were reviewed at 1 mm slice thickness.      Density:  There are scattered areas of fibroglandular density.      No suspicious masses or calcifications are identified.      Impression: No mammographic evidence of malignancy.      BI-RADS  CATEGORY:  BI-RADS Category:  1 Negative.  Recommendation:  Annual Screening.  Recommended Date:  1 Year.  Laterality:  Bilateral.              For any future breast imaging appointments, please call [phone]  ([phone]).          MACRO:  None      Signed by: Treva Agarwal 10/11/2024 4:02 PM  Dictation workstation:   [id number]      Assessment/Plan   Problem List Items Addressed This Visit             ICD-10-CM    Metabolic syndrome X E88.810    Relevant Medications    tirzepatide (Mounjaro) 5 mg/0.5 mL pen injector     Doing well on Mounjaro.  Will continue current dosing.  Follow-up 3 months for blood pressure recheck and reevaluation of weight.  Check labs next visit

## 2025-02-06 ENCOUNTER — APPOINTMENT (OUTPATIENT)
Dept: PRIMARY CARE | Facility: CLINIC | Age: 60
End: 2025-02-06
Payer: COMMERCIAL

## 2025-03-06 DIAGNOSIS — F41.9 ANXIETY AND DEPRESSION: ICD-10-CM

## 2025-03-06 DIAGNOSIS — F51.01 PRIMARY INSOMNIA: ICD-10-CM

## 2025-03-06 DIAGNOSIS — F32.A ANXIETY AND DEPRESSION: ICD-10-CM

## 2025-03-07 RX ORDER — SERTRALINE HYDROCHLORIDE 50 MG/1
50 TABLET, FILM COATED ORAL DAILY
Qty: 90 TABLET | Refills: 0 | Status: SHIPPED | OUTPATIENT
Start: 2025-03-07

## 2025-03-10 DIAGNOSIS — E88.810 METABOLIC SYNDROME X: ICD-10-CM

## 2025-03-10 RX ORDER — TIRZEPATIDE 7.5 MG/.5ML
7.5 INJECTION, SOLUTION SUBCUTANEOUS WEEKLY
Qty: 2 ML | Refills: 2 | Status: SHIPPED | OUTPATIENT
Start: 2025-03-10

## 2025-03-20 ENCOUNTER — APPOINTMENT (OUTPATIENT)
Dept: PRIMARY CARE | Facility: CLINIC | Age: 60
End: 2025-03-20
Payer: COMMERCIAL

## 2025-03-20 VITALS
DIASTOLIC BLOOD PRESSURE: 82 MMHG | HEART RATE: 70 BPM | BODY MASS INDEX: 32.59 KG/M2 | OXYGEN SATURATION: 97 % | SYSTOLIC BLOOD PRESSURE: 137 MMHG | WEIGHT: 184 LBS

## 2025-03-20 DIAGNOSIS — G43.109 MIGRAINE WITH VISUAL AURA: ICD-10-CM

## 2025-03-20 DIAGNOSIS — G43.109 MIGRAINOUS VERTIGO: ICD-10-CM

## 2025-03-20 PROCEDURE — 3075F SYST BP GE 130 - 139MM HG: CPT | Performed by: INTERNAL MEDICINE

## 2025-03-20 PROCEDURE — 3079F DIAST BP 80-89 MM HG: CPT | Performed by: INTERNAL MEDICINE

## 2025-03-20 PROCEDURE — 99213 OFFICE O/P EST LOW 20 MIN: CPT | Performed by: INTERNAL MEDICINE

## 2025-03-20 PROCEDURE — 1036F TOBACCO NON-USER: CPT | Performed by: INTERNAL MEDICINE

## 2025-03-20 PROCEDURE — G8433 SCR FOR DEP NOT CPT DOC RSN: HCPCS | Performed by: INTERNAL MEDICINE

## 2025-03-20 RX ORDER — TOPIRAMATE 25 MG/1
75 TABLET ORAL NIGHTLY
Qty: 90 TABLET | Refills: 3 | Status: SHIPPED | OUTPATIENT
Start: 2025-03-20

## 2025-03-20 RX ORDER — TOPIRAMATE 25 MG/1
75 TABLET ORAL NIGHTLY
Qty: 90 TABLET | Refills: 3 | Status: SHIPPED | OUTPATIENT
Start: 2025-03-20 | End: 2025-03-20

## 2025-03-20 ASSESSMENT — PATIENT HEALTH QUESTIONNAIRE - PHQ9
SUM OF ALL RESPONSES TO PHQ9 QUESTIONS 1 AND 2: 0
1. LITTLE INTEREST OR PLEASURE IN DOING THINGS: NOT AT ALL
2. FEELING DOWN, DEPRESSED OR HOPELESS: NOT AT ALL

## 2025-03-20 NOTE — PROGRESS NOTES
Subjective   Patient ID: Brandy Lopez is a 59 y.o. female who presents for Follow-up.        HPI     Patient is a 59-year-old female with past medical history of hypertension dyslipidemia metabolic syndrome X obesity who presents for follow-up.  Patient started on Mounjaro for treatment of obesity.  She is lost a substantial amount of weight since initiating the Mounjaro.  Currently on 7.5 mg.  Tolerating well.  16 pound weight loss.  Increasing to exercise.  Able to participate in activities with the grandkids more since losing the weight.        Review of Systems  Constitutional: No fever or chills, No Night Sweats  Eyes: No Blurry Vision or Eye sight problems  ENT: No Nasal Discharge, Hoarseness, sore throat  Cardiovascular: no chest pain, no palpitations and no syncope.   Respiratory: no cough, no shortness of breath during exertion and no shortness of breath at rest.   Gastrointestinal: no abdominal pain, no nausea and no vomiting.   : No vaginal discharge, burning with urination, no blood in urine or stools  Skin: No Skin rashes or Lesions  Neuro: No Headache, no dizziness or Numbness or tingling  Psych: No Anxiety, depression or sleeping problems  Heme: No Easy bleeding or brusing.     Objective   /82   Pulse 70   Wt 83.5 kg (184 lb)   SpO2 97%   BMI 32.59 kg/m²     Physical Exam  Patient declined Chaperone  Constitutional: Alert and in no acute distress. Well developed, well nourished.   Head and Face: Head and face: Normal.    Eyes: Normal external exam. Pupils were equal in size, round, reactive to light (PERRL) with normal accommodation and extraocular movements intact (EOMI).   Ears, Nose, Mouth, and Throat: External inspection of ears and nose: Normal.  Hearing: Normal.  Nasal mucosa, septum, and turbinates: Normal.  Lips, teeth, and gums: Normal.  Oropharynx: Normal.   Neck: No neck mass was observed. Supple. Thyroid not enlarged and there were no palpable thyroid nodules.    Cardiovascular: Heart rate and rhythm were normal, normal S1 and S2. Pedal pulses: Normal. No peripheral edema.   Pulmonary: No respiratory distress. Clear bilateral breath sounds.   Breast: Normal Appearance, No Masses or lumps palpated  Abdomen: Soft nontender; no abdominal mass palpated. Normal bowel sounds. No organomegaly.   : Deferred   Musculoskeletal: No joint swelling seen, normal movements of all extremities. Range of motion: Normal.  Muscle strength/tone: Normal.    Skin: Normal skin color and pigmentation, normal skin turgor, and no rash.   Neurologic: Deep tendon reflexes were 2+ and symmetric.   Psychiatric: Judgment and insight: Intact. Mood and affect: Normal.  Lymphatic: No cervical lymphadenopathy. Palpation of lymph nodes in axillae: Normal.  Palpation of lymph nodes in groin: Normal.    Lab Results   Component Value Date    WBC 5.0 11/20/2023    HGB 13.6 11/20/2023    HCT 43.1 11/20/2023     11/20/2023    CHOL 204 (H) 11/20/2023    TRIG 129 11/20/2023    HDL 52.1 11/20/2023    ALT 15 11/20/2023    AST 11 11/20/2023     11/20/2023    K 4.4 11/20/2023     11/20/2023    CREATININE 0.68 11/20/2023    BUN 14 11/20/2023    CO2 28 11/20/2023    TSH 2.14 11/20/2023       BI mammo bilateral screening tomosynthesis  Narrative: Interpreted By:  Treva Agarwal,   STUDY:  BI MAMMO BILATERAL SCREENING TOMOSYNTHESIS;  10/11/2024 6:56 am      ACCESSION NUMBER(S):  [id number]      ORDERING CLINICIAN:  NYASIA DAMON      INDICATION:  Screening.      ,Z12.31 Encounter for screening mammogram for malignant neoplasm of  breast      COMPARISON:  03/03/2022 10/26/2018      FINDINGS:  2D and tomosynthesis images were reviewed at 1 mm slice thickness.      Density:  There are scattered areas of fibroglandular density.      No suspicious masses or calcifications are identified.      Impression: No mammographic evidence of malignancy.      BI-RADS CATEGORY:  BI-RADS Category:  1  Negative.  Recommendation:  Annual Screening.  Recommended Date:  1 Year.  Laterality:  Bilateral.              For any future breast imaging appointments, please call [phone] ([phone]).          MACRO:  None      Signed by: Treva Agarwal 10/11/2024 4:02 PM  Dictation workstation:   [id number]      Assessment/Plan   Problem List Items Addressed This Visit    None  Visit Diagnoses         Codes    Migraine with visual aura     G43.109    Relevant Medications    topiramate (Topamax) 25 mg tablet    Migrainous vertigo     G43.109    Relevant Medications    topiramate (Topamax) 25 mg tablet          Doing well on Mounjaro.  Will continue current dosing.  Follow-up 3 months for blood pressure recheck and reevaluation of weight.  Check labs next visit    Blood pressure elevated today but previously better controlled.  Will reassess next visit and if elevated will need to adjust medications

## 2025-04-11 DIAGNOSIS — I10 PRIMARY HYPERTENSION: Primary | ICD-10-CM

## 2025-06-04 DIAGNOSIS — Z00.00 HEALTH CARE MAINTENANCE: Primary | ICD-10-CM

## 2025-06-04 DIAGNOSIS — E88.810 METABOLIC SYNDROME X: ICD-10-CM

## 2025-06-04 RX ORDER — TIRZEPATIDE 7.5 MG/.5ML
7.5 INJECTION, SOLUTION SUBCUTANEOUS WEEKLY
Qty: 2 ML | Refills: 0 | Status: SHIPPED | OUTPATIENT
Start: 2025-06-04 | End: 2025-06-04 | Stop reason: SDUPTHER

## 2025-06-04 RX ORDER — TIRZEPATIDE 7.5 MG/.5ML
7.5 INJECTION, SOLUTION SUBCUTANEOUS WEEKLY
Qty: 2 ML | Refills: 1 | Status: SHIPPED | OUTPATIENT
Start: 2025-06-04

## 2025-06-13 DIAGNOSIS — Z11.3 SCREEN FOR STD (SEXUALLY TRANSMITTED DISEASE): Primary | ICD-10-CM

## 2025-06-15 LAB
ALBUMIN SERPL-MCNC: 4.5 G/DL (ref 3.6–5.1)
ALBUMIN/CREAT UR: NORMAL
ALP SERPL-CCNC: 72 U/L (ref 37–153)
ALT SERPL-CCNC: 11 U/L (ref 6–29)
ANION GAP SERPL CALCULATED.4IONS-SCNC: 10 MMOL/L (CALC) (ref 7–17)
AST SERPL-CCNC: 13 U/L (ref 10–35)
BILIRUB SERPL-MCNC: 0.5 MG/DL (ref 0.2–1.2)
BUN SERPL-MCNC: 16 MG/DL (ref 7–25)
C TRACH RRNA SPEC QL NAA+PROBE: NORMAL
CALCIUM SERPL-MCNC: 9.6 MG/DL (ref 8.6–10.4)
CHLORIDE SERPL-SCNC: 107 MMOL/L (ref 98–110)
CHOLEST SERPL-MCNC: 229 MG/DL
CHOLEST/HDLC SERPL: 3.4 (CALC)
CO2 SERPL-SCNC: 23 MMOL/L (ref 20–32)
CREAT SERPL-MCNC: 0.85 MG/DL (ref 0.5–1.03)
CREAT UR-MCNC: NORMAL MG/DL
EGFRCR SERPLBLD CKD-EPI 2021: 79 ML/MIN/1.73M2
ERYTHROCYTE [DISTWIDTH] IN BLOOD BY AUTOMATED COUNT: 13.7 % (ref 11–15)
EST. AVERAGE GLUCOSE BLD GHB EST-MCNC: 97 MG/DL
EST. AVERAGE GLUCOSE BLD GHB EST-SCNC: 5.4 MMOL/L
GLUCOSE SERPL-MCNC: 91 MG/DL (ref 65–99)
HBA1C MFR BLD: 5 %
HBV SURFACE AG SERPL QL IA: NORMAL
HCT VFR BLD AUTO: 46.6 % (ref 35–45)
HCV AB SERPL QL IA: NORMAL
HDLC SERPL-MCNC: 68 MG/DL
HGB BLD-MCNC: 15.1 G/DL (ref 11.7–15.5)
HIV 1+2 AB+HIV1 P24 AG SERPL QL IA: NORMAL
LDLC SERPL CALC-MCNC: 138 MG/DL (CALC)
MCH RBC QN AUTO: 30.5 PG (ref 27–33)
MCHC RBC AUTO-ENTMCNC: 32.4 G/DL (ref 32–36)
MCV RBC AUTO: 94.1 FL (ref 80–100)
MICROALBUMIN UR-MCNC: NORMAL
N GONORRHOEA RRNA SPEC QL NAA+PROBE: NORMAL
NONHDLC SERPL-MCNC: 161 MG/DL (CALC)
PLATELET # BLD AUTO: 314 THOUSAND/UL (ref 140–400)
PMV BLD REES-ECKER: 9.5 FL (ref 7.5–12.5)
POTASSIUM SERPL-SCNC: 4.4 MMOL/L (ref 3.5–5.3)
PROT SERPL-MCNC: 7.1 G/DL (ref 6.1–8.1)
RBC # BLD AUTO: 4.95 MILLION/UL (ref 3.8–5.1)
SODIUM SERPL-SCNC: 140 MMOL/L (ref 135–146)
T PALLIDUM AB SER QL IA: NORMAL
TRIGL SERPL-MCNC: 113 MG/DL
TSH SERPL-ACNC: 1.72 MIU/L (ref 0.4–4.5)
WBC # BLD AUTO: 5 THOUSAND/UL (ref 3.8–10.8)

## 2025-06-16 LAB
ALBUMIN SERPL-MCNC: 4.5 G/DL (ref 3.6–5.1)
ALBUMIN/CREAT UR: 3 MG/G CREAT
ALP SERPL-CCNC: 72 U/L (ref 37–153)
ALT SERPL-CCNC: 11 U/L (ref 6–29)
ANION GAP SERPL CALCULATED.4IONS-SCNC: 10 MMOL/L (CALC) (ref 7–17)
AST SERPL-CCNC: 13 U/L (ref 10–35)
BILIRUB SERPL-MCNC: 0.5 MG/DL (ref 0.2–1.2)
BUN SERPL-MCNC: 16 MG/DL (ref 7–25)
CALCIUM SERPL-MCNC: 9.6 MG/DL (ref 8.6–10.4)
CHLORIDE SERPL-SCNC: 107 MMOL/L (ref 98–110)
CHOLEST SERPL-MCNC: 229 MG/DL
CHOLEST/HDLC SERPL: 3.4 (CALC)
CO2 SERPL-SCNC: 23 MMOL/L (ref 20–32)
CREAT SERPL-MCNC: 0.85 MG/DL (ref 0.5–1.03)
CREAT UR-MCNC: 153 MG/DL (ref 20–275)
EGFRCR SERPLBLD CKD-EPI 2021: 79 ML/MIN/1.73M2
ERYTHROCYTE [DISTWIDTH] IN BLOOD BY AUTOMATED COUNT: 13.7 % (ref 11–15)
EST. AVERAGE GLUCOSE BLD GHB EST-MCNC: 97 MG/DL
EST. AVERAGE GLUCOSE BLD GHB EST-SCNC: 5.4 MMOL/L
GLUCOSE SERPL-MCNC: 91 MG/DL (ref 65–99)
HBA1C MFR BLD: 5 %
HCT VFR BLD AUTO: 46.6 % (ref 35–45)
HDLC SERPL-MCNC: 68 MG/DL
HGB BLD-MCNC: 15.1 G/DL (ref 11.7–15.5)
LDLC SERPL CALC-MCNC: 138 MG/DL (CALC)
MCH RBC QN AUTO: 30.5 PG (ref 27–33)
MCHC RBC AUTO-ENTMCNC: 32.4 G/DL (ref 32–36)
MCV RBC AUTO: 94.1 FL (ref 80–100)
MICROALBUMIN UR-MCNC: 0.5 MG/DL
NONHDLC SERPL-MCNC: 161 MG/DL (CALC)
PLATELET # BLD AUTO: 314 THOUSAND/UL (ref 140–400)
PMV BLD REES-ECKER: 9.5 FL (ref 7.5–12.5)
POTASSIUM SERPL-SCNC: 4.4 MMOL/L (ref 3.5–5.3)
PROT SERPL-MCNC: 7.1 G/DL (ref 6.1–8.1)
RBC # BLD AUTO: 4.95 MILLION/UL (ref 3.8–5.1)
SODIUM SERPL-SCNC: 140 MMOL/L (ref 135–146)
TRIGL SERPL-MCNC: 113 MG/DL
TSH SERPL-ACNC: 1.72 MIU/L (ref 0.4–4.5)
WBC # BLD AUTO: 5 THOUSAND/UL (ref 3.8–10.8)

## 2025-06-17 ENCOUNTER — APPOINTMENT (OUTPATIENT)
Dept: PRIMARY CARE | Facility: CLINIC | Age: 60
End: 2025-06-17
Payer: COMMERCIAL

## 2025-06-17 VITALS
DIASTOLIC BLOOD PRESSURE: 79 MMHG | BODY MASS INDEX: 31 KG/M2 | HEART RATE: 65 BPM | OXYGEN SATURATION: 98 % | SYSTOLIC BLOOD PRESSURE: 119 MMHG | WEIGHT: 175 LBS

## 2025-06-17 DIAGNOSIS — E66.812 CLASS 2 SEVERE OBESITY DUE TO EXCESS CALORIES WITH SERIOUS COMORBIDITY AND BODY MASS INDEX (BMI) OF 37.0 TO 37.9 IN ADULT: Primary | ICD-10-CM

## 2025-06-17 DIAGNOSIS — Z00.00 HEALTH CARE MAINTENANCE: ICD-10-CM

## 2025-06-17 DIAGNOSIS — E66.01 CLASS 2 SEVERE OBESITY DUE TO EXCESS CALORIES WITH SERIOUS COMORBIDITY AND BODY MASS INDEX (BMI) OF 37.0 TO 37.9 IN ADULT: Primary | ICD-10-CM

## 2025-06-17 DIAGNOSIS — Z91.89 AT INCREASED RISK FOR CARDIOVASCULAR DISEASE: ICD-10-CM

## 2025-06-17 PROCEDURE — 3078F DIAST BP <80 MM HG: CPT | Performed by: INTERNAL MEDICINE

## 2025-06-17 PROCEDURE — 99213 OFFICE O/P EST LOW 20 MIN: CPT | Performed by: INTERNAL MEDICINE

## 2025-06-17 PROCEDURE — 1036F TOBACCO NON-USER: CPT | Performed by: INTERNAL MEDICINE

## 2025-06-17 PROCEDURE — 3074F SYST BP LT 130 MM HG: CPT | Performed by: INTERNAL MEDICINE

## 2025-06-17 RX ORDER — TIRZEPATIDE 10 MG/.5ML
10 INJECTION, SOLUTION SUBCUTANEOUS WEEKLY
Qty: 2 ML | Refills: 2 | Status: SHIPPED | OUTPATIENT
Start: 2025-06-17

## 2025-06-17 NOTE — PATIENT INSTRUCTIONS
We kindly ask that you take the lead and scheduling your referral appointments to ensure they align best with your availability by calling [phone].  For laboratory tests we encourage you to schedule an appointment online [email or web address] but walk-ins are available as well.  For radiology testing you can call [phone] or [phone] to schedule.  If for any reason you are having difficulty scheduling your appointments please feel free to reach out at our office by calling [phone] to assist further

## 2025-06-17 NOTE — PROGRESS NOTES
Subjective   Patient ID: Brandy Lopez is a 59 y.o. female who presents for Follow-up.        HPI     Patient is a 59-year-old female with past medical history of hypertension dyslipidemia metabolic syndrome X obesity who presents for follow-up.  Patient started on Mounjaro for treatment of obesity.  She is lost a substantial amount of weight since initiating the Mounjaro.  Currently on 7.5 mg.  Tolerating well.      33 pound weight, loss loss onon medications    Review of Systems  Constitutional: No fever or chills, No Night Sweats  Eyes: No Blurry Vision or Eye sight problems  ENT: No Nasal Discharge, Hoarseness, sore throat  Cardiovascular: no chest pain, no palpitations and no syncope.   Respiratory: no cough, no shortness of breath during exertion and no shortness of breath at rest.   Gastrointestinal: no abdominal pain, no nausea and no vomiting.   : No vaginal discharge, burning with urination, no blood in urine or stools  Skin: No Skin rashes or Lesions  Neuro: No Headache, no dizziness or Numbness or tingling  Psych: No Anxiety, depression or sleeping problems  Heme: No Easy bleeding or brusing.     Objective   /79   Pulse 65   Wt 79.4 kg (175 lb)   SpO2 98%   BMI 31.00 kg/m²     Physical Exam  Patient declined Chaperone  Constitutional: Alert and in no acute distress. Well developed, well nourished.   Head and Face: Head and face: Normal.    Eyes: Normal external exam. Pupils were equal in size, round, reactive to light (PERRL) with normal accommodation and extraocular movements intact (EOMI).   Ears, Nose, Mouth, and Throat: External inspection of ears and nose: Normal.  Hearing: Normal.  Nasal mucosa, septum, and turbinates: Normal.  Lips, teeth, and gums: Normal.  Oropharynx: Normal.   Neck: No neck mass was observed. Supple. Thyroid not enlarged and there were no palpable thyroid nodules.   Cardiovascular: Heart rate and rhythm were normal, normal S1 and S2. Pedal pulses: Normal. No  peripheral edema.   Pulmonary: No respiratory distress. Clear bilateral breath sounds.   Breast: Normal Appearance, No Masses or lumps palpated  Abdomen: Soft nontender; no abdominal mass palpated. Normal bowel sounds. No organomegaly.   : Deferred   Musculoskeletal: No joint swelling seen, normal movements of all extremities. Range of motion: Normal.  Muscle strength/tone: Normal.    Skin: Normal skin color and pigmentation, normal skin turgor, and no rash.   Neurologic: Deep tendon reflexes were 2+ and symmetric.   Psychiatric: Judgment and insight: Intact. Mood and affect: Normal.  Lymphatic: No cervical lymphadenopathy. Palpation of lymph nodes in axillae: Normal.  Palpation of lymph nodes in groin: Normal.    Lab Results   Component Value Date    WBC 5.0 06/14/2025    HGB 15.1 06/14/2025    HCT 46.6 (H) 06/14/2025     06/14/2025    CHOL 229 (H) 06/14/2025    TRIG 113 06/14/2025    HDL 68 06/14/2025    ALT 11 06/14/2025    AST 13 06/14/2025     06/14/2025    K 4.4 06/14/2025     06/14/2025    CREATININE 0.85 06/14/2025    BUN 16 06/14/2025    CO2 23 06/14/2025    TSH 1.72 06/14/2025    HGBA1C 5.0 06/14/2025       BI mammo bilateral screening tomosynthesis  Narrative: Interpreted By:  Treva Agarwal,   STUDY:  BI MAMMO BILATERAL SCREENING TOMOSYNTHESIS;  10/11/2024 6:56 am      ACCESSION NUMBER(S):  [id number]      ORDERING CLINICIAN:  NYASIA DAMON      INDICATION:  Screening.      ,Z12.31 Encounter for screening mammogram for malignant neoplasm of  breast      COMPARISON:  03/03/2022 10/26/2018      FINDINGS:  2D and tomosynthesis images were reviewed at 1 mm slice thickness.      Density:  There are scattered areas of fibroglandular density.      No suspicious masses or calcifications are identified.      Impression: No mammographic evidence of malignancy.      BI-RADS CATEGORY:  BI-RADS Category:  1 Negative.  Recommendation:  Annual Screening.  Recommended Date:  1 Year.  Laterality:   Bilateral.              For any future breast imaging appointments, please call [phone]).          MACRO:  None      Signed by: Treva Agarwal 10/11/2024 4:02 PM  Dictation workstation:   [id number]      Assessment/Plan   Problem List Items Addressed This Visit           ICD-10-CM    Class 2 severe obesity with serious comorbidity and body mass index (BMI) of 37.0 to 37.9 in adult - Primary E66.812, Z68.37, E66.01    Relevant Medications    tirzepatide (Mounjaro) 10 mg/0.5 mL pen injector     Other Visit Diagnoses         Codes      Health care maintenance     Z00.00    Relevant Orders    HSV1 IgG and HSV2 IgG      At increased risk for cardiovascular disease     Z91.89    Relevant Orders    CT cardiac scoring wo IV contrast          Doing well on Mounjaro.  Increase dose to 10 mg follow-up 3 months for blood pressure recheck and reevaluation of weight.  Check labs next visit    Blood pressure well-controlled.    Cholesterol elevated but previously better controlled.  Will hold off on cholesterol-lowering medication and check calcium score of the heart as well as recheck lipid panel with more weight reduction in 2 visits.    Follow-up 3 months

## 2025-06-18 DIAGNOSIS — R23.3 EASY BRUISING: Primary | ICD-10-CM

## 2025-06-18 LAB
C TRACH RRNA SPEC QL NAA+PROBE: NOT DETECTED
HBV SURFACE AG SERPL QL IA: NORMAL
HCV AB SERPL QL IA: NORMAL
HIV 1+2 AB+HIV1 P24 AG SERPL QL IA: NORMAL
N GONORRHOEA RRNA SPEC QL NAA+PROBE: NOT DETECTED
QUEST GC CT AMPLIFIED (ALWAYS MESSAGE): NORMAL
T PALLIDUM AB SER QL IA: NEGATIVE

## 2025-06-30 DIAGNOSIS — F41.9 ANXIETY AND DEPRESSION: ICD-10-CM

## 2025-06-30 DIAGNOSIS — F32.A ANXIETY AND DEPRESSION: ICD-10-CM

## 2025-06-30 DIAGNOSIS — F51.01 PRIMARY INSOMNIA: ICD-10-CM

## 2025-07-01 DIAGNOSIS — F32.A ANXIETY AND DEPRESSION: ICD-10-CM

## 2025-07-01 DIAGNOSIS — F41.9 ANXIETY AND DEPRESSION: ICD-10-CM

## 2025-07-01 DIAGNOSIS — F51.01 PRIMARY INSOMNIA: ICD-10-CM

## 2025-07-01 RX ORDER — SERTRALINE HYDROCHLORIDE 50 MG/1
50 TABLET, FILM COATED ORAL DAILY
Qty: 90 TABLET | Refills: 0 | Status: SHIPPED | OUTPATIENT
Start: 2025-07-01

## 2025-07-02 RX ORDER — SERTRALINE HYDROCHLORIDE 50 MG/1
50 TABLET, FILM COATED ORAL DAILY
Qty: 30 TABLET | Refills: 2 | OUTPATIENT
Start: 2025-07-02

## 2025-08-01 DIAGNOSIS — E88.810 METABOLIC SYNDROME X: ICD-10-CM

## 2025-08-02 RX ORDER — TIRZEPATIDE 7.5 MG/.5ML
7.5 INJECTION, SOLUTION SUBCUTANEOUS
OUTPATIENT
Start: 2025-08-02

## 2025-08-13 ENCOUNTER — OFFICE VISIT (OUTPATIENT)
Dept: PRIMARY CARE | Facility: CLINIC | Age: 60
End: 2025-08-13
Payer: COMMERCIAL

## 2025-08-13 VITALS
TEMPERATURE: 98.1 F | WEIGHT: 173 LBS | DIASTOLIC BLOOD PRESSURE: 83 MMHG | SYSTOLIC BLOOD PRESSURE: 128 MMHG | BODY MASS INDEX: 30.65 KG/M2 | HEART RATE: 69 BPM | OXYGEN SATURATION: 98 %

## 2025-08-13 DIAGNOSIS — R39.9 UTI SYMPTOMS: Primary | ICD-10-CM

## 2025-08-13 LAB
APPEARANCE UR: CLEAR
BILIRUB UR QL STRIP: NEGATIVE
COLOR UR: YELLOW
GLUCOSE UR STRIP-MCNC: NEGATIVE MG/DL
HGB UR QL STRIP: ABNORMAL
KETONES UR STRIP-MCNC: NEGATIVE MG/DL
LEUKOCYTE ESTERASE UR QL STRIP: ABNORMAL
NITRITE UR QL STRIP: NEGATIVE
PH UR STRIP: 5.5 [PH]
POC APPEARANCE, URINE: ABNORMAL
POC BILIRUBIN, URINE: NEGATIVE
POC BLOOD, URINE: ABNORMAL
POC COLOR, URINE: ABNORMAL
POC GLUCOSE, URINE: NEGATIVE MG/DL
POC KETONES, URINE: NEGATIVE MG/DL
POC LEUKOCYTES, URINE: ABNORMAL
POC NITRITE,URINE: NEGATIVE
POC PH, URINE: 5.5 PH
POC PROTEIN, URINE: ABNORMAL MG/DL
POC SPECIFIC GRAVITY, URINE: >=1.03
POC UROBILINOGEN, URINE: 0.2 EU/DL
PROT UR STRIP-MCNC: ABNORMAL MG/DL
SP GR UR STRIP.AUTO: >=1.03
UROBILINOGEN UR STRIP-ACNC: 0.2 E.U./DL

## 2025-08-13 PROCEDURE — 99213 OFFICE O/P EST LOW 20 MIN: CPT | Performed by: INTERNAL MEDICINE

## 2025-08-13 PROCEDURE — 81003 URINALYSIS AUTO W/O SCOPE: CPT | Performed by: INTERNAL MEDICINE

## 2025-08-13 PROCEDURE — 3079F DIAST BP 80-89 MM HG: CPT | Performed by: INTERNAL MEDICINE

## 2025-08-13 PROCEDURE — 3074F SYST BP LT 130 MM HG: CPT | Performed by: INTERNAL MEDICINE

## 2025-08-13 RX ORDER — NITROFURANTOIN 25; 75 MG/1; MG/1
100 CAPSULE ORAL 2 TIMES DAILY
Qty: 10 CAPSULE | Refills: 0 | Status: SHIPPED | OUTPATIENT
Start: 2025-08-13 | End: 2025-08-18

## 2025-08-15 LAB
BACTERIA UR CULT: ABNORMAL
QUEST TRACKING HOUSE ACCOUNT: NORMAL

## 2025-08-26 DIAGNOSIS — E88.810 METABOLIC SYNDROME X: ICD-10-CM

## 2025-08-26 RX ORDER — TIRZEPATIDE 7.5 MG/.5ML
7.5 INJECTION, SOLUTION SUBCUTANEOUS
OUTPATIENT
Start: 2025-08-26

## 2025-08-29 DIAGNOSIS — G43.109 MIGRAINOUS VERTIGO: ICD-10-CM

## 2025-08-29 DIAGNOSIS — G43.109 MIGRAINE WITH VISUAL AURA: ICD-10-CM

## 2025-08-29 RX ORDER — TOPIRAMATE 25 MG/1
75 TABLET, FILM COATED ORAL NIGHTLY
Qty: 90 TABLET | Refills: 0 | Status: SHIPPED | OUTPATIENT
Start: 2025-08-29

## 2025-09-17 ENCOUNTER — APPOINTMENT (OUTPATIENT)
Dept: PRIMARY CARE | Facility: CLINIC | Age: 60
End: 2025-09-17
Payer: COMMERCIAL